# Patient Record
Sex: MALE | Race: WHITE | NOT HISPANIC OR LATINO | Employment: OTHER | URBAN - METROPOLITAN AREA
[De-identification: names, ages, dates, MRNs, and addresses within clinical notes are randomized per-mention and may not be internally consistent; named-entity substitution may affect disease eponyms.]

---

## 2017-01-03 RX ORDER — CHLORDIAZEPOXIDE HYDROCHLORIDE AND CLIDINIUM BROMIDE 5; 2.5 MG/1; MG/1
1 CAPSULE ORAL
Qty: 90 CAPSULE | Refills: 0 | Status: SHIPPED | OUTPATIENT
Start: 2017-01-03 | End: 2017-01-19

## 2017-01-04 ENCOUNTER — OFFICE VISIT (OUTPATIENT)
Dept: GASTROENTEROLOGY | Facility: CLINIC | Age: 47
End: 2017-01-04

## 2017-01-04 VITALS — HEIGHT: 73 IN | WEIGHT: 252.6 LBS | BODY MASS INDEX: 33.48 KG/M2

## 2017-01-04 DIAGNOSIS — R10.33 PERIUMBILICAL ABDOMINAL PAIN: Primary | ICD-10-CM

## 2017-01-04 DIAGNOSIS — K42.9 UMBILICAL HERNIA WITHOUT OBSTRUCTION AND WITHOUT GANGRENE: ICD-10-CM

## 2017-01-04 PROCEDURE — 99213 OFFICE O/P EST LOW 20 MIN: CPT | Performed by: INTERNAL MEDICINE

## 2017-01-04 RX ORDER — HYDROCORTISONE ACETATE 25 MG/1
25 SUPPOSITORY RECTAL 2 TIMES DAILY PRN
Qty: 30 SUPPOSITORY | Refills: 0 | Status: SHIPPED | OUTPATIENT
Start: 2017-01-04 | End: 2017-01-19

## 2017-01-04 NOTE — PROGRESS NOTES
PATIENT INFORMATION  Robles Rodrigues       - 1970    CHIEF COMPLAINT  Chief Complaint   Patient presents with   • Abdominal Pain     FOLLOW UP ON EGD AND C/S   • Rectal Pain       HISTORY OF PRESENT ILLNESS  Abdominal Pain     Rectal Pain   Associated symptoms include abdominal pain.   he is doing better with upper abdominal pain with librax tid. He is still having intermittent umbilical pain several times daily.  Weight has been stable. He has a bm daily but does note that stools are hard and there is some rectal pain with this. Biopsies were reviewed and polyps were TA and gastritis without h.pylori. He is on ppi therapy but does not take miralax daily. Hemorrhoids noted and i think rectal pain with constipation is likely hemorrhioids.        REVIEW OF SYSTEMS  Review of Systems   Gastrointestinal: Positive for abdominal pain and rectal pain.   All other systems reviewed and are negative.        ACTIVE PROBLEMS  Patient Active Problem List    Diagnosis   • Fast heart beat [R00.0]   • Tachycardia [R00.0]   • Edema [R60.9]         PAST MEDICAL HISTORY  Past Medical History   Diagnosis Date   • GERD (gastroesophageal reflux disease)    • Hyperlipidemia    • Hypertension    • Obesity    • Tachycardia          SURGICAL HISTORY  Past Surgical History   Procedure Laterality Date   • Cholecystectomy     • Urethral dilation     • Endoscopy N/A 2016     Procedure: ESOPHAGOGASTRODUODENOSCOPY w/ biopsies;  Surgeon: Laura Galloway MD;  Location: [facility] OR;  Service:    • Colonoscopy N/A 2016     Procedure: COLONOSCOPY w/ polypectomy;  Surgeon: Laura Galloway MD;  Location: [facility] OR;  Service:          FAMILY HISTORY  Family History   Problem Relation Age of Onset   • Depression Father    • Coronary artery disease Father    • Hypertension Father    • Heart disease Father    • Hypertension Sister    • Hypertension Brother          SOCIAL HISTORY  Social History     Occupational History   • Not on  "file.     Social History Main Topics   • Smoking status: Never Smoker   • Smokeless tobacco: Never Used   • Alcohol use No   • Drug use: No   • Sexual activity: Defer         CURRENT MEDICATIONS    Current Outpatient Prescriptions:   •  aspirin 81 MG EC tablet, Take 81 mg by mouth Daily., Disp: , Rfl:   •  busPIRone (BUSPAR) 10 MG tablet, Take 10 mg by mouth 2 (two) times a day., Disp: , Rfl:   •  citalopram (CeleXA) 20 MG tablet, Take 20 mg by mouth Daily., Disp: , Rfl:   •  clidinium-chlordiazePOXIDE (LIBRAX) 5-2.5 MG per capsule, Take 1 capsule by mouth 3 (Three) Times a Day With Meals., Disp: 90 capsule, Rfl: 0  •  hydrocortisone (ANUSOL-HC) 25 MG suppository, Insert 1 suppository into the rectum 2 (Two) Times a Day As Needed for hemorrhoids (rectal discomfort)., Disp: 30 suppository, Rfl: 0  •  metoprolol succinate XL (TOPROL-XL) 25 MG 24 hr tablet, Take 25 mg by mouth 2 (two) times a day., Disp: , Rfl:   •  omeprazole (PriLOSEC) 20 MG capsule, Take 20 mg by mouth daily., Disp: , Rfl:   •  rosuvastatin (CRESTOR) 10 MG tablet, Take  by mouth daily., Disp: , Rfl:   •  silodosin (RAPAFLO) 8 MG capsule capsule, Take  by mouth daily., Disp: , Rfl:     ALLERGIES  Review of patient's allergies indicates no known allergies.    VITALS  Vitals:    01/04/17 1456   Weight: 252 lb 9.6 oz (115 kg)   Height: 73\" (185.4 cm)       LAST RESULTS   Admission on 12/14/2016, Discharged on 12/14/2016   Component Date Value Ref Range Status   • Case Report 12/14/2016    Final                    Value:Surgical Pathology Report                         Case: [id number]                                  Authorizing Provider:  Laura Galloway MD          Collected:           12/14/2016 01:01 PM          Ordering Location:     [facility]   Received:            12/14/2016 05:07 PM                                 OR                                                                           Pathologist:           Aditi So " MD Anna                                                     Specimens:   1) - Small Intestine, Duodenum, small bowel biopsy                                                  2) - Stomach, gastric biopsy                                                                        3) - Large Intestine, Cecum, cecal polyp                                                            4) - Large Intestine, Left / Descending Colon, descending polyp                           • Final Diagnosis 12/14/2016    Final                    Value:This result contains rich text formatting which cannot be displayed here.     No results found.    PHYSICAL EXAM  Physical Exam   Constitutional: He is oriented to person, place, and time. He appears well-developed and well-nourished. No distress.   HENT:   Head: Normocephalic and atraumatic.   Eyes: EOM are normal. Pupils are equal, round, and reactive to light.   Neck: Neck supple. No tracheal deviation present.   Cardiovascular: Normal rate, regular rhythm, normal heart sounds and intact distal pulses.  Exam reveals no gallop and no friction rub.    No murmur heard.  Pulmonary/Chest: Effort normal and breath sounds normal. No respiratory distress. He has no wheezes. He has no rales. He exhibits no tenderness.   Abdominal: Soft. Bowel sounds are normal. He exhibits no distension. There is tenderness. There is no rebound and no guarding.   Umbilical hernia is soft but is tender  To palpation   Musculoskeletal: He exhibits no edema.   Lymphadenopathy:     He has no cervical adenopathy.   Neurological: He is alert and oriented to person, place, and time.   Skin: Skin is warm. He is not diaphoretic. No erythema.   Psychiatric: He has a normal mood and affect. His behavior is normal. Judgment and thought content normal.   Nursing note and vitals reviewed.      ASSESSMENT  Diagnoses and all orders for this visit:    Periumbilical abdominal pain  -     FL small bowel follow through; Future    Umbilical  hernia without obstruction and without gangrene    Other orders  -     hydrocortisone (ANUSOL-HC) 25 MG suppository; Insert 1 suppository into the rectum 2 (Two) Times a Day As Needed for hemorrhoids (rectal discomfort).          PLAN  No Follow-up on file.

## 2017-01-04 NOTE — LETTER
January 4, 2017     Aron White MD  [address] [address]    Patient: Robles Rodrigues   YOB: 1970   Date of Visit: 1/4/2017       Dear Dr. Christopher MD:    Robles Ravi was in my office today. Below are the relevant portions of my assessment and plan of care.           If you have questions, please do not hesitate to call me. I look forward to following Robles along with you.         Sincerely,        Laura Galloway MD        CC: No Recipients

## 2017-01-04 NOTE — MR AVS SNAPSHOT
Robles JANEE Rodrigues   1/4/2017 3:00 PM   Office Visit    Dept Phone:  [phone]   Encounter #:  [id number]    Provider:  Laura Galloway MD   Department:  [facility] GASTROENTEROLOGY                Your Full Care Plan              Today's Medication Changes          These changes are accurate as of: 1/4/17  3:18 PM.  If you have any questions, ask your nurse or doctor.               New Medication(s)Ordered:     hydrocortisone 25 MG suppository   Commonly known as:  ANUSOL-HC   Insert 1 suppository into the rectum 2 (Two) Times a Day As Needed for hemorrhoids (rectal discomfort).   Started by:  Laura Galloway MD            Where to Get Your Medications      These medications were sent to [facility] - [place], [place] - [address] - [phone]  - [phone] FX  [address], [place] [address]     Phone:  [phone]     hydrocortisone 25 MG suppository                  Your Updated Medication List          This list is accurate as of: 1/4/17  3:18 PM.  Always use your most recent med list.                aspirin 81 MG EC tablet       busPIRone 10 MG tablet   Commonly known as:  BUSPAR       citalopram 20 MG tablet   Commonly known as:  CeleXA       clidinium-chlordiazePOXIDE 5-2.5 MG per capsule   Commonly known as:  LIBRAX   Take 1 capsule by mouth 3 (Three) Times a Day With Meals.       CRESTOR 10 MG tablet   Generic drug:  rosuvastatin       hydrocortisone 25 MG suppository   Commonly known as:  ANUSOL-HC   Insert 1 suppository into the rectum 2 (Two) Times a Day As Needed for hemorrhoids (rectal discomfort).       metoprolol succinate XL 25 MG 24 hr tablet   Commonly known as:  TOPROL-XL       omeprazole 20 MG capsule   Commonly known as:  priLOSEC       RAPAFLO 8 MG capsule capsule   Generic drug:  silodosin               You Were Diagnosed With        Codes Comments    Periumbilical abdominal pain    -  Primary ICD-10-CM:  "R10.33  ICD-9-CM: 789.05     Umbilical hernia without obstruction and without gangrene     ICD-10-CM: K42.9  ICD-9-CM: 553.1       Instructions     None    Patient Instructions History      Upcoming Appointments     Visit Type Date Time Department    OFFICE VISIT 1/4/2017  3:00 PM [facility]      MyChart Signup     Our records indicate that you have declined [facility] MyChart signup. If you would like to sign up for [employer], please email [email or web address] or call [phone] to obtain an activation code.             Other Info from Your Visit           Allergies     No Known Allergies      Reason for Visit     Abdominal Pain FOLLOW UP ON EGD AND C/S    Rectal Pain           Vital Signs     Height Weight Body Mass Index Smoking Status          73\" (185.4 cm) 252 lb 9.6 oz (115 kg) 33.33 kg/m2 Never Smoker        Problems and Diagnoses Noted     Abdominal pain, around belly button    -  Primary    Umbilical hernia without obstruction and without gangrene            "

## 2017-01-11 ENCOUNTER — HOSPITAL ENCOUNTER (OUTPATIENT)
Dept: GENERAL RADIOLOGY | Facility: HOSPITAL | Age: 47
Discharge: HOME | End: 2017-01-11
Attending: INTERNAL MEDICINE | Admitting: INTERNAL MEDICINE

## 2017-01-11 DIAGNOSIS — R10.33 PERIUMBILICAL ABDOMINAL PAIN: ICD-10-CM

## 2017-01-11 PROCEDURE — 74250 X-RAY XM SM INT 1CNTRST STD: CPT

## 2017-01-18 ENCOUNTER — OFFICE VISIT (OUTPATIENT)
Dept: SURGERY | Facility: CLINIC | Age: 47
End: 2017-01-18

## 2017-01-18 VITALS
WEIGHT: 252 LBS | HEIGHT: 73 IN | SYSTOLIC BLOOD PRESSURE: 122 MMHG | BODY MASS INDEX: 33.4 KG/M2 | DIASTOLIC BLOOD PRESSURE: 82 MMHG

## 2017-01-18 DIAGNOSIS — K42.9 UMBILICAL HERNIA WITHOUT OBSTRUCTION AND WITHOUT GANGRENE: Primary | ICD-10-CM

## 2017-01-18 PROCEDURE — 99212 OFFICE O/P EST SF 10 MIN: CPT | Performed by: SURGERY

## 2017-01-18 NOTE — H&P
PATIENT INFORMATION  Robles Rodrigues  FU [facility], PT STATES AREA IS MORE PAINFUL     - 1970    CHIEF COMPLAINT  Chief Complaint   Patient presents with   • Follow-up       HISTORY OF PRESENT ILLNESS  HPI is upper abdominal pain has resolved on the Librax.  He has had recent endoscopy by Dr. Galloway.  He complains of daily periumbilical pain.  He denies any nausea or vomiting.  He did have a CT scan performed that showed a small umbilical hernia.        REVIEW OF SYSTEMS  Review of Systems   Gastrointestinal: Positive for abdominal pain.         ACTIVE PROBLEMS  Patient Active Problem List    Diagnosis   • Fast heart beat [R00.0]   • Tachycardia [R00.0]   • Edema [R60.9]   • Benign essential hypertension [I10]   • Chronic coronary artery disease [I25.10]     Overview Note:     Overview:   #1 normal nuclear stress test and normal 2-D Doppler echo 2014  #2 CT scan of the chest demonstrating coronary calcium 2014     • Hyperlipidemia [E78.5]         PAST MEDICAL HISTORY  Past Medical History   Diagnosis Date   • GERD (gastroesophageal reflux disease)    • Hyperlipidemia    • Hypertension    • Obesity    • Tachycardia          SURGICAL HISTORY  Past Surgical History   Procedure Laterality Date   • Cholecystectomy     • Urethral dilation     • Endoscopy N/A 2016     Procedure: ESOPHAGOGASTRODUODENOSCOPY w/ biopsies;  Surgeon: Laura Galloway MD;  Location: [facility] OR;  Service:    • Colonoscopy N/A 2016     Procedure: COLONOSCOPY w/ polypectomy;  Surgeon: Laura Galloway MD;  Location: [facility] OR;  Service:          FAMILY HISTORY  Family History   Problem Relation Age of Onset   • Depression Father    • Coronary artery disease Father    • Hypertension Father    • Heart disease Father    • Hypertension Sister    • Hypertension Brother          SOCIAL HISTORY  Social History     Occupational History   • Not on file.     Social History Main Topics   • Smoking status: Never Smoker   •  "Smokeless tobacco: Never Used   • Alcohol use No   • Drug use: No   • Sexual activity: Defer         CURRENT MEDICATIONS    Current Outpatient Prescriptions:   •  aspirin 81 MG EC tablet, Take 81 mg by mouth Daily., Disp: , Rfl:   •  busPIRone (BUSPAR) 10 MG tablet, Take 10 mg by mouth 2 (two) times a day., Disp: , Rfl:   •  citalopram (CeleXA) 20 MG tablet, Take 20 mg by mouth Daily., Disp: , Rfl:   •  clidinium-chlordiazePOXIDE (LIBRAX) 5-2.5 MG per capsule, Take 1 capsule by mouth 3 (Three) Times a Day With Meals., Disp: 90 capsule, Rfl: 0  •  hydrocortisone (ANUSOL-HC) 25 MG suppository, Insert 1 suppository into the rectum 2 (Two) Times a Day As Needed for hemorrhoids (rectal discomfort)., Disp: 30 suppository, Rfl: 0  •  metoprolol succinate XL (TOPROL-XL) 25 MG 24 hr tablet, Take 25 mg by mouth 2 (two) times a day., Disp: , Rfl:   •  omeprazole (PriLOSEC) 20 MG capsule, Take 20 mg by mouth daily., Disp: , Rfl:   •  rosuvastatin (CRESTOR) 10 MG tablet, Take  by mouth daily., Disp: , Rfl:   •  silodosin (RAPAFLO) 8 MG capsule capsule, Take  by mouth daily., Disp: , Rfl:     ALLERGIES  Review of patient's allergies indicates no known allergies.    VITALS  Vitals:    01/18/17 1530   BP: 122/82   Weight: 252 lb (114 kg)   Height: 73\" (185.4 cm)       LAST RESULTS   Admission on 12/14/2016, Discharged on 12/14/2016   Component Date Value Ref Range Status   • Case Report 12/14/2016    Final                    Value:Surgical Pathology Report                         Case: [id number]                                  Authorizing Provider:  Laura Galloway MD          Collected:           12/14/2016 01:01 PM          Ordering Location:     [facility]   Received:            12/14/2016 05:07 PM                                 OR                                                                           Pathologist:           Aditi Castillo MD                                                     Specimens: "   1) - Small Intestine, Duodenum, small bowel biopsy                                                  2) - Stomach, gastric biopsy                                                                        3) - Large Intestine, Cecum, cecal polyp                                                            4) - Large Intestine, Left / Descending Colon, descending polyp                           • Final Diagnosis 12/14/2016    Final                    Value:This result contains rich text formatting which cannot be displayed here.     Fl Small Bowel Follow Through    Result Date: 1/11/2017  Narrative: Small bowel follow-through  INDICATION: Periumbilical abdominal pain for one week.  FINDINGS:  image demonstrates cholecystectomy clips. A small bowel follow-through was performed. Fluoroscopy time is 43 seconds. 16 total images were obtained, including the  views. Transit time through the small bowel is prompt with contrast reaching the colon within 40 minutes. The small bowel is uniformly normal in caliber. There is no evidence of bowel wall thickening. No mass effect is seen within the abdomen. Manual spot compression of multiple bowel loops is also normal. The terminal ileum is normal.      Impression: Normal small bowel follow-through.  This report was finalized on 1/11/2017 10:44 AM by Dr. Chandra Tadeo MD.        PHYSICAL EXAM  Physical Exam solar white male in no active distress.  He is oriented ×3.  His gait is normal.  His trachea is in the midline.  His heart shows regular rate and rhythm.  His lungs are clear and equal.  He has a small reducible umbilical hernia that is reducible.  I reviewed his CT scan myself that showed a small umbilical hernia.    ASSESSMENT  Umbilical hernia      PLAN  The risks benefits and options were discussed with the patient and his mother in detail.  We will proceed with an open repair at [facility] on January 20 on an outpatient  basis.

## 2017-01-18 NOTE — PROGRESS NOTES
PATIENT INFORMATION  Robles Rodrigues  FU [facility] HERN[facility], PT STATES AREA IS MORE PAINFUL     - 1970    CHIEF COMPLAINT  Chief Complaint   Patient presents with   • Follow-up       HISTORY OF PRESENT ILLNESS  HPI is upper abdominal pain has resolved on the Librax.  He has had recent endoscopy by Dr. Galloway.  He complains of daily periumbilical pain.  He denies any nausea or vomiting.  He did have a CT scan performed that showed a small umbilical hernia.        REVIEW OF SYSTEMS  Review of Systems   Gastrointestinal: Positive for abdominal pain.         ACTIVE PROBLEMS  Patient Active Problem List    Diagnosis   • Fast heart beat [R00.0]   • Tachycardia [R00.0]   • Edema [R60.9]   • Benign essential hypertension [I10]   • Chronic coronary artery disease [I25.10]     Overview Note:     Overview:   #1 normal nuclear stress test and normal 2-D Doppler echo 2014  #2 CT scan of the chest demonstrating coronary calcium 2014     • Hyperlipidemia [E78.5]         PAST MEDICAL HISTORY  Past Medical History   Diagnosis Date   • GERD (gastroesophageal reflux disease)    • Hyperlipidemia    • Hypertension    • Obesity    • Tachycardia          SURGICAL HISTORY  Past Surgical History   Procedure Laterality Date   • Cholecystectomy     • Urethral dilation     • Endoscopy N/A 2016     Procedure: ESOPHAGOGASTRODUODENOSCOPY w/ biopsies;  Surgeon: Laura Galloway MD;  Location: [facility] OR;  Service:    • Colonoscopy N/A 2016     Procedure: COLONOSCOPY w/ polypectomy;  Surgeon: Laura Galloway MD;  Location: [facility] OR;  Service:          FAMILY HISTORY  Family History   Problem Relation Age of Onset   • Depression Father    • Coronary artery disease Father    • Hypertension Father    • Heart disease Father    • Hypertension Sister    • Hypertension Brother          SOCIAL HISTORY  Social History     Occupational History   • Not on file.     Social History Main Topics   • Smoking status: Never Smoker   •  "Smokeless tobacco: Never Used   • Alcohol use No   • Drug use: No   • Sexual activity: Defer         CURRENT MEDICATIONS    Current Outpatient Prescriptions:   •  aspirin 81 MG EC tablet, Take 81 mg by mouth Daily., Disp: , Rfl:   •  busPIRone (BUSPAR) 10 MG tablet, Take 10 mg by mouth 2 (two) times a day., Disp: , Rfl:   •  citalopram (CeleXA) 20 MG tablet, Take 20 mg by mouth Daily., Disp: , Rfl:   •  clidinium-chlordiazePOXIDE (LIBRAX) 5-2.5 MG per capsule, Take 1 capsule by mouth 3 (Three) Times a Day With Meals., Disp: 90 capsule, Rfl: 0  •  hydrocortisone (ANUSOL-HC) 25 MG suppository, Insert 1 suppository into the rectum 2 (Two) Times a Day As Needed for hemorrhoids (rectal discomfort)., Disp: 30 suppository, Rfl: 0  •  metoprolol succinate XL (TOPROL-XL) 25 MG 24 hr tablet, Take 25 mg by mouth 2 (two) times a day., Disp: , Rfl:   •  omeprazole (PriLOSEC) 20 MG capsule, Take 20 mg by mouth daily., Disp: , Rfl:   •  rosuvastatin (CRESTOR) 10 MG tablet, Take  by mouth daily., Disp: , Rfl:   •  silodosin (RAPAFLO) 8 MG capsule capsule, Take  by mouth daily., Disp: , Rfl:     ALLERGIES  Review of patient's allergies indicates no known allergies.    VITALS  Vitals:    01/18/17 1530   BP: 122/82   Weight: 252 lb (114 kg)   Height: 73\" (185.4 cm)       LAST RESULTS   Admission on 12/14/2016, Discharged on 12/14/2016   Component Date Value Ref Range Status   • Case Report 12/14/2016    Final                    Value:Surgical Pathology Report                         Case: [id number]                                  Authorizing Provider:  Laura Galloway MD          Collected:           12/14/2016 01:01 PM          Ordering Location:     [facility]   Received:            12/14/2016 05:07 PM                                 OR                                                                           Pathologist:           Aditi Castillo MD                                                     Specimens: "   1) - Small Intestine, Duodenum, small bowel biopsy                                                  2) - Stomach, gastric biopsy                                                                        3) - Large Intestine, Cecum, cecal polyp                                                            4) - Large Intestine, Left / Descending Colon, descending polyp                           • Final Diagnosis 12/14/2016    Final                    Value:This result contains rich text formatting which cannot be displayed here.     Fl Small Bowel Follow Through    Result Date: 1/11/2017  Narrative: Small bowel follow-through  INDICATION: Periumbilical abdominal pain for one week.  FINDINGS:  image demonstrates cholecystectomy clips. A small bowel follow-through was performed. Fluoroscopy time is 43 seconds. 16 total images were obtained, including the  views. Transit time through the small bowel is prompt with contrast reaching the colon within 40 minutes. The small bowel is uniformly normal in caliber. There is no evidence of bowel wall thickening. No mass effect is seen within the abdomen. Manual spot compression of multiple bowel loops is also normal. The terminal ileum is normal.      Impression: Normal small bowel follow-through.  This report was finalized on 1/11/2017 10:44 AM by Dr. Chandra Tadeo MD.        PHYSICAL EXAM  Physical Exam solar white male in no active distress.  He is oriented ×3.  His gait is normal.  His trachea is in the midline.  His heart shows regular rate and rhythm.  His lungs are clear and equal.  He has a small reducible umbilical hernia that is reducible.  I reviewed his CT scan myself that showed a small umbilical hernia.    ASSESSMENT  Umbilical hernia      PLAN  The risks benefits and options were discussed with the patient and his mother in detail.  We will proceed with an open repair at [facility] on January 20 on an outpatient  basis.

## 2017-01-18 NOTE — LETTER
2017     Aron White MD  [address] [facility]  [place] [address]    Patient: Robles Rodrigues   YOB: 1970   Date of Visit: 2017       Dear Dr. Christopher MD:    Thank you for referring Robles Rodrigues to me for evaluation. Below are the relevant portions of my assessment and plan of care.    If you have questions, please do not hesitate to call me. I look forward to following Robles along with you.         Sincerely,        Ramo Medina MD        CC: No Recipients  Ramo Medina MD  2017  3:50 PM  Sign at close encounter      PATIENT INFORMATION  Robles Rodrigues  FU [facility]ERN[facility], PT STATES AREA IS MORE PAINFUL     - 1970    CHIEF COMPLAINT  Chief Complaint   Patient presents with   • Follow-up       HISTORY OF PRESENT ILLNESS  HPI is upper abdominal pain has resolved on the Librax.  He has had recent endoscopy by Dr. Galloway.  He complains of daily periumbilical pain.  He denies any nausea or vomiting.  He did have a CT scan performed that showed a small umbilical hernia.        REVIEW OF SYSTEMS  Review of Systems   Gastrointestinal: Positive for abdominal pain.         ACTIVE PROBLEMS  Patient Active Problem List    Diagnosis   • Fast heart beat [R00.0]   • Tachycardia [R00.0]   • Edema [R60.9]   • Benign essential hypertension [I10]   • Chronic coronary artery disease [I25.10]     Overview Note:     Overview:   #1 normal nuclear stress test and normal 2-D Doppler echo 2014  #2 CT scan of the chest demonstrating coronary calcium 2014     • Hyperlipidemia [E78.5]         PAST MEDICAL HISTORY  Past Medical History   Diagnosis Date   • GERD (gastroesophageal reflux disease)    • Hyperlipidemia    • Hypertension    • Obesity    • Tachycardia          SURGICAL HISTORY  Past Surgical History   Procedure Laterality Date   • Cholecystectomy     • Urethral dilation     • Endoscopy N/A 2016     Procedure: ESOPHAGOGASTRODUODENOSCOPY w/ biopsies;  Surgeon:  "Laura Galloway MD;  Location: [facility] OR;  Service:    • Colonoscopy N/A 12/14/2016     Procedure: COLONOSCOPY w/ polypectomy;  Surgeon: Laura Galloway MD;  Location: [facility] OR;  Service:          FAMILY HISTORY  Family History   Problem Relation Age of Onset   • Depression Father    • Coronary artery disease Father    • Hypertension Father    • Heart disease Father    • Hypertension Sister    • Hypertension Brother          SOCIAL HISTORY  Social History     Occupational History   • Not on file.     Social History Main Topics   • Smoking status: Never Smoker   • Smokeless tobacco: Never Used   • Alcohol use No   • Drug use: No   • Sexual activity: Defer         CURRENT MEDICATIONS    Current Outpatient Prescriptions:   •  aspirin 81 MG EC tablet, Take 81 mg by mouth Daily., Disp: , Rfl:   •  busPIRone (BUSPAR) 10 MG tablet, Take 10 mg by mouth 2 (two) times a day., Disp: , Rfl:   •  citalopram (CeleXA) 20 MG tablet, Take 20 mg by mouth Daily., Disp: , Rfl:   •  clidinium-chlordiazePOXIDE (LIBRAX) 5-2.5 MG per capsule, Take 1 capsule by mouth 3 (Three) Times a Day With Meals., Disp: 90 capsule, Rfl: 0  •  hydrocortisone (ANUSOL-HC) 25 MG suppository, Insert 1 suppository into the rectum 2 (Two) Times a Day As Needed for hemorrhoids (rectal discomfort)., Disp: 30 suppository, Rfl: 0  •  metoprolol succinate XL (TOPROL-XL) 25 MG 24 hr tablet, Take 25 mg by mouth 2 (two) times a day., Disp: , Rfl:   •  omeprazole (PriLOSEC) 20 MG capsule, Take 20 mg by mouth daily., Disp: , Rfl:   •  rosuvastatin (CRESTOR) 10 MG tablet, Take  by mouth daily., Disp: , Rfl:   •  silodosin (RAPAFLO) 8 MG capsule capsule, Take  by mouth daily., Disp: , Rfl:     ALLERGIES  Review of patient's allergies indicates no known allergies.    VITALS  Vitals:    01/18/17 1530   BP: 122/82   Weight: 252 lb (114 kg)   Height: 73\" (185.4 cm)       LAST RESULTS   Admission on 12/14/2016, Discharged on 12/14/2016   Component Date Value Ref Range Status "   • Case Report 12/14/2016    Final                    Value:Surgical Pathology Report                         Case: [id number]                                  Authorizing Provider:  Laura Galloway MD          Collected:           12/14/2016 01:01 PM          Ordering Location:     [facility]   Received:            12/14/2016 05:07 PM                                 OR                                                                           Pathologist:           Aditi Castillo MD                                                     Specimens:   1) - Small Intestine, Duodenum, small bowel biopsy                                                  2) - Stomach, gastric biopsy                                                                        3) - Large Intestine, Cecum, cecal polyp                                                            4) - Large Intestine, Left / Descending Colon, descending polyp                           • Final Diagnosis 12/14/2016    Final                    Value:This result contains rich text formatting which cannot be displayed here.     Fl Small Bowel Follow Through    Result Date: 1/11/2017  Narrative: Small bowel follow-through  INDICATION: Periumbilical abdominal pain for one week.  FINDINGS:  image demonstrates cholecystectomy clips. A small bowel follow-through was performed. Fluoroscopy time is 43 seconds. 16 total images were obtained, including the  views. Transit time through the small bowel is prompt with contrast reaching the colon within 40 minutes. The small bowel is uniformly normal in caliber. There is no evidence of bowel wall thickening. No mass effect is seen within the abdomen. Manual spot compression of multiple bowel loops is also normal. The terminal ileum is normal.      Impression: Normal small bowel follow-through.  This report was finalized on 1/11/2017 10:44 AM by Dr. Chandra Tadeo MD.        PHYSICAL EXAM  Physical Exam solar  white male in no active distress.  He is oriented ×3.  His gait is normal.  His trachea is in the midline.  His heart shows regular rate and rhythm.  His lungs are clear and equal.  He has a small reducible umbilical hernia that is reducible.  I reviewed his CT scan myself that showed a small umbilical hernia.    ASSESSMENT  Umbilical hernia      PLAN  The risks benefits and options were discussed with the patient and his mother in detail.  We will proceed with an open repair at [facility] on January 20 on an outpatient basis.

## 2017-01-18 NOTE — MR AVS SNAPSHOT
"Padminiangel Rodrigues   1/18/2017 3:30 PM   Office Visit    Dept Phone:  [phone]   Encounter #:  [id number]    Provider:  Ramo Medina MD   Department:  [facility] GENERAL SURGERY                Your Full Care Plan              Your Updated Medication List          This list is accurate as of: 1/18/17  3:51 PM.  Always use your most recent med list.                aspirin 81 MG EC tablet       busPIRone 10 MG tablet   Commonly known as:  BUSPAR       citalopram 20 MG tablet   Commonly known as:  CeleXA       clidinium-chlordiazePOXIDE 5-2.5 MG per capsule   Commonly known as:  LIBRAX   Take 1 capsule by mouth 3 (Three) Times a Day With Meals.       CRESTOR 10 MG tablet   Generic drug:  rosuvastatin       hydrocortisone 25 MG suppository   Commonly known as:  ANUSOL-HC   Insert 1 suppository into the rectum 2 (Two) Times a Day As Needed for hemorrhoids (rectal discomfort).       metoprolol succinate XL 25 MG 24 hr tablet   Commonly known as:  TOPROL-XL       omeprazole 20 MG capsule   Commonly known as:  priLOSEC       RAPAFLO 8 MG capsule capsule   Generic drug:  silodosin               You Were Diagnosed With        Codes Comments    Umbilical hernia without obstruction and without gangrene    -  Primary ICD-10-CM: K42.9  ICD-9-CM: 553.1       Instructions     None    Patient Instructions History      Upcoming Appointments     Visit Type Date Time Department    OFFICE VISIT 1/18/2017  3:30 PM [facility]      MyChart Signup     Our records indicate that you have declined [facility] [employer] signup. If you would like to sign up for [employer], please email [email or web address] or call [phone] to obtain an activation code.             Other Info from Your Visit           Allergies     No Known Allergies      Reason for Visit     Follow-up           Vital Signs     Blood Pressure Height Weight Body Mass Index Smoking Status       122/82 73\" (185.4 " cm) 252 lb (114 kg) 33.25 kg/m2 Never Smoker       Problems and Diagnoses Noted     Benign essential hypertension    Heart disease due to blocked artery    High cholesterol or triglycerides    Umbilical hernia without obstruction and without gangrene    -  Primary

## 2017-01-19 ENCOUNTER — APPOINTMENT (OUTPATIENT)
Dept: PREADMISSION TESTING | Facility: HOSPITAL | Age: 47
End: 2017-01-19
Attending: SURGERY

## 2017-01-19 ENCOUNTER — ANESTHESIA EVENT (OUTPATIENT)
Dept: PERIOP | Facility: HOSPITAL | Age: 47
End: 2017-01-19

## 2017-01-19 VITALS
HEIGHT: 73 IN | BODY MASS INDEX: 33.24 KG/M2 | DIASTOLIC BLOOD PRESSURE: 68 MMHG | SYSTOLIC BLOOD PRESSURE: 114 MMHG | OXYGEN SATURATION: 96 % | WEIGHT: 250.8 LBS | RESPIRATION RATE: 16 BRPM | HEART RATE: 85 BPM

## 2017-01-19 LAB
ANION GAP SERPL CALCULATED.3IONS-SCNC: 11.3 MMOL/L
BUN BLD-MCNC: 10 MG/DL (ref 6–20)
BUN/CREAT SERPL: 11.8 (ref 7–25)
CALCIUM SPEC-SCNC: 9.2 MG/DL (ref 8.6–10.5)
CHLORIDE SERPL-SCNC: 101 MMOL/L (ref 98–107)
CO2 SERPL-SCNC: 27.7 MMOL/L (ref 22–29)
CREAT BLD-MCNC: 0.85 MG/DL (ref 0.76–1.27)
DEPRECATED RDW RBC AUTO: 41.1 FL (ref 37–54)
ERYTHROCYTE [DISTWIDTH] IN BLOOD BY AUTOMATED COUNT: 13.2 % (ref 11.5–14.5)
GFR SERPL CREATININE-BSD FRML MDRD: 96 ML/MIN/1.73
GLUCOSE BLD-MCNC: 114 MG/DL (ref 65–99)
HCT VFR BLD AUTO: 44.8 % (ref 42–52)
HGB BLD-MCNC: 14.7 G/DL (ref 14–18)
MCH RBC QN AUTO: 28.3 PG (ref 27–31)
MCHC RBC AUTO-ENTMCNC: 32.8 G/DL (ref 31–37)
MCV RBC AUTO: 86.3 FL (ref 80–94)
PLATELET # BLD AUTO: 198 10*3/MM3 (ref 140–500)
PMV BLD AUTO: 10.8 FL (ref 7.4–10.4)
POTASSIUM BLD-SCNC: 4.6 MMOL/L (ref 3.5–5.2)
RBC # BLD AUTO: 5.19 10*6/MM3 (ref 4.7–6.1)
SODIUM BLD-SCNC: 140 MMOL/L (ref 136–145)
WBC NRBC COR # BLD: 5.18 10*3/MM3 (ref 4.8–10.8)

## 2017-01-19 PROCEDURE — 93010 ELECTROCARDIOGRAM REPORT: CPT | Performed by: INTERNAL MEDICINE

## 2017-01-19 PROCEDURE — 36415 COLL VENOUS BLD VENIPUNCTURE: CPT

## 2017-01-19 PROCEDURE — 93005 ELECTROCARDIOGRAM TRACING: CPT

## 2017-01-19 PROCEDURE — 80048 BASIC METABOLIC PNL TOTAL CA: CPT | Performed by: SURGERY

## 2017-01-19 PROCEDURE — 85027 COMPLETE CBC AUTOMATED: CPT | Performed by: SURGERY

## 2017-01-19 RX ORDER — DICYCLOMINE HYDROCHLORIDE 10 MG/1
10 CAPSULE ORAL
COMMUNITY
End: 2019-02-06

## 2017-01-19 NOTE — PAT
Pt and mom here for PAT visit.  Pre-op tests completed, chg soap given, and instructions reviewed.

## 2017-01-19 NOTE — DISCHARGE INSTRUCTIONS
PRE-ADMISSION TESTING INSTRUCTIONS FOR ADULTS    Take your Metoprolol and Omeprazole with a small sip of water.  General Instructions:    • Do not eat or drink after midnight: includes water, mints, or gum. You may brush your teeth.  • Do not smoke, chew tobacco, or drink alcohol.  • Bring medications in original bottles, any inhalers and if applicable your C-PAP/ BI-PAP machine.  • Wear clean comfortable clothes and socks.  • Do not wear contact lenses, lotion, deodorant, or make-up.  Bring a case for your glasses if applicable.   • Bring crutches or walker if applicable.  • Leave all other valuables and jewelry at home.      Preventing a Surgical Site Infection:    • Shower the night before and on the morning of surgery using the chlorhexidine soap you were given.  Use a clean washcloth with the soap. Do not use the CHG soap on your hair, face, or private areas. Wash your body gently for five (5) minutes. Do not scrub your skin too hard.  Dry with a clean towel and dress in clean clothing.    • Do not shave the surgical area for a week prior to surgery  because the razor can irritate skin and make it easier to develop an infection.    • Make sure you, your family, and all healthcare providers clean their hands with soap and water or an alcohol based hand  before caring for you or your wound.    • If at all possible, quit smoking as many days before surgery as you can.    Day of surgery:    Your surgeon’s office will advise you of your arrival time for the day of surgery.    Upon arrival, a Pre-op nurse and Anesthesia provider will review your health history, obtain vital signs, and answer questions you may have.  The only belongings needed at this time will be your home medications and if applicable your C-PAP/BI-PAP machine.  If you are staying overnight your family can leave the rest of your belongings in the car and bring them to your room later.  A Pre-op nurse will start an IV and you may receive  medication in preparation for surgery, including something to help you relax.  Your family will be able to see you in the Pre-op area.  While you are in surgery your family should notify the waiting room  if they leave the waiting room area and provide a contact phone number.    IF you have any questions, you can call the Pre-Admission Department at [phone] or your surgeon's office.    Please be aware that surgery does come with discomfort.  We want to make every effort to control your discomfort so please discuss any uncontrolled symptoms with your nurse.   Your doctor will most likely have prescribed pain medications.      If you are going home after surgery you will receive individualized written care instructions before being discharged.  A responsible adult must drive you to and from the hospital on the day of your surgery and stay with you for 24 hours.    If you are staying overnight following surgery, you will be transported to your hospital room following the recovery period.  [facility] has all private rooms.    Deductibles and co-payments are collected on the day of service. Please be prepared to pay the required co-pay, deductible or deposit on the day of service as defined by your plan.      Umbilical Herniorrhaphy  Herniorrhaphy is surgery to repair a hernia. A hernia is the protrusion of a part of an organ through an abdominal opening. An umbilical hernia means that your hernia is in the area around your navel. If the hernia is not repaired, the gap could get bigger. Your intestines or other tissues, such as fat, could get trapped in the gap. This can lead to other health problems, such as blocked intestines. If the hernia is fixed before problems set in, you may be allowed to go home the same day as the surgery (outpatient).  LET YOUR HEALTH CARE PROVIDER KNOW ABOUT:  · Allergies to food or medicine.  · Medicines taken, including vitamins, herbs, eye drops,  over-the-counter medicines, and creams.  · Use of steroids (by mouth or creams).  · Previous problems with anesthetics or numbing medicines.  · History of bleeding problems or blood clots.  · Previous surgery.  · Other health problems, including diabetes and kidney problems.  · Possibility of pregnancy, if this applies.  RISKS AND COMPLICATIONS  · Pain.  · Excessive bleeding.  · Hematoma. This is a pocket of blood that collects under the surgery site.  · Infection at the surgery site.  · Numbness at the surgery site.  · Swelling and bruising.  · Blood clots.  · Intestinal damage (rare).  · Scarring.  · Skin damage.  · Development of another hernia. This may require another surgery.  BEFORE THE PROCEDURE  · Ask your health care provider about changing or stopping your regular medicines. You may need to stop taking aspirin, nonsteroidal anti-inflammatory drugs (NSAIDs), vitamin E, and blood thinners as early as 2 weeks before the procedure.  · Do not eat or drink for 8 hours before the procedure, or as directed by your health care provider.  · You might be asked to shower or wash with an antibacterial soap before the procedure.  · Wear comfortable clothes that will be easy to put on after the procedure.  PROCEDURE  You will be given an intravenous (IV) tube. A needle will be inserted in your arm. Medicine will flow directly into your body through this needle. You might be given medicine to help you relax (sedative). You will be given medicine that numbs the area (local anesthetic) or medicine that makes you sleep (general anesthetic).  If you have open surgery:  · The surgeon will make a cut (incision) in your abdomen.  · The gap in the muscle wall will be repaired. The surgeon may sew the edges together over the gap or use a mesh material to strengthen the area. When mesh is used, the body grows new, strong tissue into and around it. This new tissue closes the gap.  · The surgeon will close the incision.  If you have  laparoscopic surgery:  · The surgeon will make several small incisions in your abdomen.  · A thin, lighted tube (laparoscope) will be inserted into the abdomen through an incision. A camera is attached to the laparoscope that allows the surgeon to see inside the abdomen.  · Tools will be inserted through the other incisions to repair the hernia. Usually, mesh is used to cover the gap.  · The surgeon will close the incisions with stitches.  AFTER THE PROCEDURE  · You will be taken to a recovery area. A nurse will watch and check your progress.  · When you are awake, feeling well, and taking fluids well, you may be allowed to go home. In some cases, you may need to stay overnight in the hospital.  · Arrange for someone to drive you home.     This information is not intended to replace advice given to you by your health care provider. Make sure you discuss any questions you have with your health care provider.     Document Released: 03/16/2010 Document Revised: 01/08/2016 Document Reviewed: 03/20/2013  [employer] Interactive Patient Education ©2016 [employer]

## 2017-01-19 NOTE — MR AVS SNAPSHOT
"                        Robles Rodrigues   1/19/2017 3:00 PM   Appointment    Provider:  MAY RANKIN 2   Department:  [facility] PREADMISSION T   Dept Phone:  [phone]                Your Full Care Plan              Your Updated Medication List          This list is accurate as of: 1/19/17  3:02 PM.  Always use your most recent med list.                aspirin 81 MG EC tablet       busPIRone 10 MG tablet   Commonly known as:  BUSPAR       citalopram 20 MG tablet   Commonly known as:  CeleXA       CRESTOR 10 MG tablet   Generic drug:  rosuvastatin       dicyclomine 10 MG capsule   Commonly known as:  BENTYL       metoprolol succinate XL 25 MG 24 hr tablet   Commonly known as:  TOPROL-XL       omeprazole 20 MG capsule   Commonly known as:  priLOSEC       RAPAFLO 8 MG capsule capsule   Generic drug:  silodosin               MyChart Signup     Our records indicate that you have declined [facility] MyChart signup. If you would like to sign up for [employer], please email [email or web address] or call [phone] to obtain an activation code.             Other Info from Your Visit           Allergies     Influenza Vaccines Other (See Comments)    Swelling at injection site      Vital Signs     Blood Pressure Pulse Respirations Height Weight Oxygen Saturation    114/68 (BP Location: Right arm, Patient Position: Sitting) 85 16 73\" (185.4 cm) 250 lb 12.8 oz (114 kg) 96%    Body Mass Index Smoking Status                33.09 kg/m2 Never Smoker            Discharge Instructions       PRE-ADMISSION TESTING INSTRUCTIONS FOR ADULTS    Take your Metoprolol and Omeprazole with a small sip of water.  General Instructions:    • Do not eat or drink after midnight: includes water, mints, or gum. You may brush your teeth.  • Do not smoke, chew tobacco, or drink alcohol.  • Bring medications in original bottles, any inhalers and if applicable your C-PAP/ BI-PAP machine.  • Wear clean comfortable clothes and " socks.  • Do not wear contact lenses, lotion, deodorant, or make-up.  Bring a case for your glasses if applicable.   • Bring crutches or walker if applicable.  • Leave all other valuables and jewelry at home.      Preventing a Surgical Site Infection:    • Shower the night before and on the morning of surgery using the chlorhexidine soap you were given.  Use a clean washcloth with the soap. Do not use the CHG soap on your hair, face, or private areas. Wash your body gently for five (5) minutes. Do not scrub your skin too hard.  Dry with a clean towel and dress in clean clothing.    • Do not shave the surgical area for a week prior to surgery  because the razor can irritate skin and make it easier to develop an infection.    • Make sure you, your family, and all healthcare providers clean their hands with soap and water or an alcohol based hand  before caring for you or your wound.    • If at all possible, quit smoking as many days before surgery as you can.    Day of surgery:    Your surgeon’s office will advise you of your arrival time for the day of surgery.    Upon arrival, a Pre-op nurse and Anesthesia provider will review your health history, obtain vital signs, and answer questions you may have.  The only belongings needed at this time will be your home medications and if applicable your C-PAP/BI-PAP machine.  If you are staying overnight your family can leave the rest of your belongings in the car and bring them to your room later.  A Pre-op nurse will start an IV and you may receive medication in preparation for surgery, including something to help you relax.  Your family will be able to see you in the Pre-op area.  While you are in surgery your family should notify the waiting room  if they leave the waiting room area and provide a contact phone number.    IF you have any questions, you can call the Pre-Admission Department at [phone] or your surgeon's office.    Please be aware  that surgery does come with discomfort.  We want to make every effort to control your discomfort so please discuss any uncontrolled symptoms with your nurse.   Your doctor will most likely have prescribed pain medications.      If you are going home after surgery you will receive individualized written care instructions before being discharged.  A responsible adult must drive you to and from the hospital on the day of your surgery and stay with you for 24 hours.    If you are staying overnight following surgery, you will be transported to your hospital room following the recovery period.  [facility] has all private rooms.    Deductibles and co-payments are collected on the day of service. Please be prepared to pay the required co-pay, deductible or deposit on the day of service as defined by your plan.      Umbilical Herniorrhaphy  Herniorrhaphy is surgery to repair a hernia. A hernia is the protrusion of a part of an organ through an abdominal opening. An umbilical hernia means that your hernia is in the area around your navel. If the hernia is not repaired, the gap could get bigger. Your intestines or other tissues, such as fat, could get trapped in the gap. This can lead to other health problems, such as blocked intestines. If the hernia is fixed before problems set in, you may be allowed to go home the same day as the surgery (outpatient).  LET YOUR HEALTH CARE PROVIDER KNOW ABOUT:  · Allergies to food or medicine.  · Medicines taken, including vitamins, herbs, eye drops, over-the-counter medicines, and creams.  · Use of steroids (by mouth or creams).  · Previous problems with anesthetics or numbing medicines.  · History of bleeding problems or blood clots.  · Previous surgery.  · Other health problems, including diabetes and kidney problems.  · Possibility of pregnancy, if this applies.  RISKS AND COMPLICATIONS  · Pain.  · Excessive bleeding.  · Hematoma. This is a pocket of blood that collects  under the surgery site.  · Infection at the surgery site.  · Numbness at the surgery site.  · Swelling and bruising.  · Blood clots.  · Intestinal damage (rare).  · Scarring.  · Skin damage.  · Development of another hernia. This may require another surgery.  BEFORE THE PROCEDURE  · Ask your health care provider about changing or stopping your regular medicines. You may need to stop taking aspirin, nonsteroidal anti-inflammatory drugs (NSAIDs), vitamin E, and blood thinners as early as 2 weeks before the procedure.  · Do not eat or drink for 8 hours before the procedure, or as directed by your health care provider.  · You might be asked to shower or wash with an antibacterial soap before the procedure.  · Wear comfortable clothes that will be easy to put on after the procedure.  PROCEDURE  You will be given an intravenous (IV) tube. A needle will be inserted in your arm. Medicine will flow directly into your body through this needle. You might be given medicine to help you relax (sedative). You will be given medicine that numbs the area (local anesthetic) or medicine that makes you sleep (general anesthetic).  If you have open surgery:  · The surgeon will make a cut (incision) in your abdomen.  · The gap in the muscle wall will be repaired. The surgeon may sew the edges together over the gap or use a mesh material to strengthen the area. When mesh is used, the body grows new, strong tissue into and around it. This new tissue closes the gap.  · The surgeon will close the incision.  If you have laparoscopic surgery:  · The surgeon will make several small incisions in your abdomen.  · A thin, lighted tube (laparoscope) will be inserted into the abdomen through an incision. A camera is attached to the laparoscope that allows the surgeon to see inside the abdomen.  · Tools will be inserted through the other incisions to repair the hernia. Usually, mesh is used to cover the gap.  · The surgeon will close the incisions  with stitches.  AFTER THE PROCEDURE  · You will be taken to a recovery area. A nurse will watch and check your progress.  · When you are awake, feeling well, and taking fluids well, you may be allowed to go home. In some cases, you may need to stay overnight in the hospital.  · Arrange for someone to drive you home.     This information is not intended to replace advice given to you by your health care provider. Make sure you discuss any questions you have with your health care provider.     Document Released: 03/16/2010 Document Revised: 01/08/2016 Document Reviewed: 03/20/2013  [employer] Interactive Patient Education ©2016 [employer]       SYMPTOMS OF A STROKE    Call 911 or have someone take you to the Emergency Department if you have any of the following:    · Sudden numbness or weakness of your face, arm or leg especially on one side of the body  · Sudden confusion, diffiiculty speaking or trouble understanding   · Changes in your vision or loss of sight in one eye  · Sudden severe headache with no known cause  · sudden dizziness, trouble walking, loss of balance or coordination    It is important to seek emergency care right away if you have further stroke symptoms. If you get emergency help quickly, the powerful clot-dissolving medicines can reduce the disabilities caused by a stroke.     For more information:    American Stroke Association  5-788-5-STROKE  www.strokeassociation.org           IF YOU SMOKE OR USE TOBACCO PLEASE READ THE FOLLOWING:    Why is smoking bad for me?  Smoking increases the risk of heart disease, lung disease, vascular disease, stroke, and cancer.     If you smoke, STOP!    If you would like more information on quitting smoking, please visit the [employer] website: [email or web address]   This link will provide additional resources including the QUIT line and the [employer] the [employer] support groups.     For more information:    American Cancer  Society  (117) 653-8639    American Heart Association  1-103.236.3915

## 2017-01-20 ENCOUNTER — ANESTHESIA (OUTPATIENT)
Dept: PERIOP | Facility: HOSPITAL | Age: 47
End: 2017-01-20

## 2017-01-20 ENCOUNTER — HOSPITAL ENCOUNTER (OUTPATIENT)
Facility: HOSPITAL | Age: 47
Setting detail: HOSPITAL OUTPATIENT SURGERY
Discharge: HOME | End: 2017-01-20
Attending: SURGERY | Admitting: SURGERY

## 2017-01-20 VITALS
WEIGHT: 251 LBS | HEART RATE: 71 BPM | RESPIRATION RATE: 16 BRPM | SYSTOLIC BLOOD PRESSURE: 120 MMHG | OXYGEN SATURATION: 96 % | BODY MASS INDEX: 35.14 KG/M2 | TEMPERATURE: 97.9 F | HEIGHT: 71 IN | DIASTOLIC BLOOD PRESSURE: 89 MMHG

## 2017-01-20 DIAGNOSIS — K42.9 UMBILICAL HERNIA WITHOUT OBSTRUCTION AND WITHOUT GANGRENE: ICD-10-CM

## 2017-01-20 PROCEDURE — 25010000002 HYDROMORPHONE PER 4 MG: Performed by: NURSE ANESTHETIST, CERTIFIED REGISTERED

## 2017-01-20 PROCEDURE — 25010000002 MIDAZOLAM PER 1 MG: Performed by: NURSE ANESTHETIST, CERTIFIED REGISTERED

## 2017-01-20 PROCEDURE — 25010000002 PROPOFOL 10 MG/ML EMULSION: Performed by: NURSE ANESTHETIST, CERTIFIED REGISTERED

## 2017-01-20 PROCEDURE — 25010000002 FENTANYL CITRATE (PF) 100 MCG/2ML SOLUTION: Performed by: NURSE ANESTHETIST, CERTIFIED REGISTERED

## 2017-01-20 PROCEDURE — 49587 PR REPAIR UMBILICAL HERN,5+Y/O,STRANG: CPT | Performed by: SURGERY

## 2017-01-20 PROCEDURE — 25010000002 HYDROMORPHONE PER 4 MG

## 2017-01-20 PROCEDURE — 25010000002 ONDANSETRON PER 1 MG: Performed by: NURSE ANESTHETIST, CERTIFIED REGISTERED

## 2017-01-20 RX ORDER — MIDAZOLAM HYDROCHLORIDE 1 MG/ML
1 INJECTION INTRAMUSCULAR; INTRAVENOUS
Status: DISCONTINUED | OUTPATIENT
Start: 2017-01-20 | End: 2017-01-20 | Stop reason: HOSPADM

## 2017-01-20 RX ORDER — ONDANSETRON 2 MG/ML
4 INJECTION INTRAMUSCULAR; INTRAVENOUS ONCE AS NEEDED
Status: DISCONTINUED | OUTPATIENT
Start: 2017-01-20 | End: 2017-01-20 | Stop reason: HOSPADM

## 2017-01-20 RX ORDER — HYDROMORPHONE HCL 110MG/55ML
PATIENT CONTROLLED ANALGESIA SYRINGE INTRAVENOUS
Status: COMPLETED
Start: 2017-01-20 | End: 2017-01-20

## 2017-01-20 RX ORDER — FENTANYL CITRATE 50 UG/ML
INJECTION, SOLUTION INTRAMUSCULAR; INTRAVENOUS AS NEEDED
Status: DISCONTINUED | OUTPATIENT
Start: 2017-01-20 | End: 2017-01-20 | Stop reason: SURG

## 2017-01-20 RX ORDER — OXYCODONE HYDROCHLORIDE AND ACETAMINOPHEN 5; 325 MG/1; MG/1
TABLET ORAL
Status: COMPLETED
Start: 2017-01-20 | End: 2017-01-20

## 2017-01-20 RX ORDER — MAGNESIUM HYDROXIDE 1200 MG/15ML
LIQUID ORAL AS NEEDED
Status: DISCONTINUED | OUTPATIENT
Start: 2017-01-20 | End: 2017-01-20 | Stop reason: HOSPADM

## 2017-01-20 RX ORDER — MIDAZOLAM HYDROCHLORIDE 1 MG/ML
2 INJECTION INTRAMUSCULAR; INTRAVENOUS
Status: DISCONTINUED | OUTPATIENT
Start: 2017-01-20 | End: 2017-01-20 | Stop reason: HOSPADM

## 2017-01-20 RX ORDER — OXYCODONE HYDROCHLORIDE AND ACETAMINOPHEN 5; 325 MG/1; MG/1
1 TABLET ORAL AS NEEDED
Status: CANCELLED | OUTPATIENT
Start: 2017-01-20 | End: 2017-01-21

## 2017-01-20 RX ORDER — SODIUM CHLORIDE, SODIUM LACTATE, POTASSIUM CHLORIDE, CALCIUM CHLORIDE 600; 310; 30; 20 MG/100ML; MG/100ML; MG/100ML; MG/100ML
9 INJECTION, SOLUTION INTRAVENOUS CONTINUOUS
Status: DISCONTINUED | OUTPATIENT
Start: 2017-01-20 | End: 2017-01-20 | Stop reason: HOSPADM

## 2017-01-20 RX ORDER — PROPOFOL 10 MG/ML
VIAL (ML) INTRAVENOUS AS NEEDED
Status: DISCONTINUED | OUTPATIENT
Start: 2017-01-20 | End: 2017-01-20 | Stop reason: SURG

## 2017-01-20 RX ORDER — LIDOCAINE HYDROCHLORIDE 20 MG/ML
INJECTION, SOLUTION INFILTRATION; PERINEURAL AS NEEDED
Status: DISCONTINUED | OUTPATIENT
Start: 2017-01-20 | End: 2017-01-20 | Stop reason: SURG

## 2017-01-20 RX ORDER — SODIUM CHLORIDE 0.9 % (FLUSH) 0.9 %
1-10 SYRINGE (ML) INJECTION AS NEEDED
Status: DISCONTINUED | OUTPATIENT
Start: 2017-01-20 | End: 2017-01-20 | Stop reason: HOSPADM

## 2017-01-20 RX ORDER — HYDROMORPHONE HCL 110MG/55ML
1 PATIENT CONTROLLED ANALGESIA SYRINGE INTRAVENOUS
Status: DISCONTINUED | OUTPATIENT
Start: 2017-01-20 | End: 2017-01-20 | Stop reason: HOSPADM

## 2017-01-20 RX ORDER — MEPERIDINE HYDROCHLORIDE 25 MG/ML
12.5 INJECTION INTRAMUSCULAR; INTRAVENOUS; SUBCUTANEOUS
Status: DISCONTINUED | OUTPATIENT
Start: 2017-01-20 | End: 2017-01-20 | Stop reason: HOSPADM

## 2017-01-20 RX ORDER — FAMOTIDINE 10 MG/ML
20 INJECTION, SOLUTION INTRAVENOUS ONCE
Status: COMPLETED | OUTPATIENT
Start: 2017-01-20 | End: 2017-01-20

## 2017-01-20 RX ORDER — SODIUM CHLORIDE, SODIUM LACTATE, POTASSIUM CHLORIDE, CALCIUM CHLORIDE 600; 310; 30; 20 MG/100ML; MG/100ML; MG/100ML; MG/100ML
100 INJECTION, SOLUTION INTRAVENOUS CONTINUOUS
Status: DISCONTINUED | OUTPATIENT
Start: 2017-01-20 | End: 2017-01-20 | Stop reason: HOSPADM

## 2017-01-20 RX ORDER — LIDOCAINE HYDROCHLORIDE 10 MG/ML
0.3 INJECTION, SOLUTION EPIDURAL; INFILTRATION; INTRACAUDAL; PERINEURAL ONCE
Status: COMPLETED | OUTPATIENT
Start: 2017-01-20 | End: 2017-01-20

## 2017-01-20 RX ORDER — ONDANSETRON 2 MG/ML
4 INJECTION INTRAMUSCULAR; INTRAVENOUS ONCE AS NEEDED
Status: COMPLETED | OUTPATIENT
Start: 2017-01-20 | End: 2017-01-20

## 2017-01-20 RX ORDER — BUPIVACAINE HYDROCHLORIDE 2.5 MG/ML
INJECTION, SOLUTION INFILTRATION; PERINEURAL AS NEEDED
Status: DISCONTINUED | OUTPATIENT
Start: 2017-01-20 | End: 2017-01-20 | Stop reason: HOSPADM

## 2017-01-20 RX ADMIN — LIDOCAINE HYDROCHLORIDE 100 MG: 20 INJECTION, SOLUTION INFILTRATION; PERINEURAL at 08:18

## 2017-01-20 RX ADMIN — LIDOCAINE HYDROCHLORIDE 0.3 ML: 10 INJECTION, SOLUTION EPIDURAL; INFILTRATION; INTRACAUDAL; PERINEURAL at 08:02

## 2017-01-20 RX ADMIN — FENTANYL CITRATE 100 MCG: 50 INJECTION, SOLUTION INTRAMUSCULAR; INTRAVENOUS at 08:17

## 2017-01-20 RX ADMIN — FAMOTIDINE 20 MG: 10 INJECTION, SOLUTION INTRAVENOUS at 08:02

## 2017-01-20 RX ADMIN — PROPOFOL 150 MG: 10 INJECTION, EMULSION INTRAVENOUS at 08:18

## 2017-01-20 RX ADMIN — SODIUM CHLORIDE, POTASSIUM CHLORIDE, SODIUM LACTATE AND CALCIUM CHLORIDE 9 ML/HR: 600; 310; 30; 20 INJECTION, SOLUTION INTRAVENOUS at 08:01

## 2017-01-20 RX ADMIN — SODIUM CHLORIDE, POTASSIUM CHLORIDE, SODIUM LACTATE AND CALCIUM CHLORIDE: 600; 310; 30; 20 INJECTION, SOLUTION INTRAVENOUS at 07:53

## 2017-01-20 RX ADMIN — HYDROMORPHONE HYDROCHLORIDE 1 MG: 2 INJECTION, SOLUTION INTRAMUSCULAR; INTRAVENOUS; SUBCUTANEOUS at 09:13

## 2017-01-20 RX ADMIN — MIDAZOLAM HYDROCHLORIDE 1 MG: 1 INJECTION, SOLUTION INTRAMUSCULAR; INTRAVENOUS at 08:02

## 2017-01-20 RX ADMIN — HYDROMORPHONE HYDROCHLORIDE 1 MG: 2 INJECTION, SOLUTION INTRAMUSCULAR; INTRAVENOUS; SUBCUTANEOUS at 09:24

## 2017-01-20 RX ADMIN — ONDANSETRON 4 MG: 2 INJECTION, SOLUTION INTRAMUSCULAR; INTRAVENOUS at 08:03

## 2017-01-20 RX ADMIN — OXYCODONE AND ACETAMINOPHEN 1 TABLET: 5; 325 TABLET ORAL at 10:13

## 2017-01-20 NOTE — IP AVS SNAPSHOT
AFTER VISIT SUMMARY             Robles Rodrigues           About your hospitalization     You were admitted on:  January 20, 2017 You last received care in the:  [facility] OR       Procedures & Surgeries      Procedure(s) (LRB):  UMBILICAL HERNIA REPAIR (N/A)     1/20/2017     Surgeon(s):  Ramo Medina MD  -------------------      Medications    If you or your caregiver advised us that you are currently taking a medication and that medication is marked below as “Resume”, this simply indicates that we have reviewed those medications to make sure our new therapy recommendations do not interfere.  If you have concerns about medications other than those new ones which we are prescribing today, please consult the physician who prescribed them (or your primary physician).  Our review of your home medications is not meant to indicate that we are directing their use.             Your Medications      CONTINUE taking these medications     aspirin 81 MG EC tablet   Take 81 mg by mouth Daily.           busPIRone 10 MG tablet   Take 10 mg by mouth 2 (Two) Times a Day As Needed (anxiety).   Commonly known as:  BUSPAR           citalopram 20 MG tablet   Take 20 mg by mouth Daily.   Commonly known as:  CeleXA           CRESTOR 10 MG tablet   Take 10 mg by mouth Every Night.   Generic drug:  rosuvastatin           dicyclomine 10 MG capsule   Take 10 mg by mouth 3 (Three) Times a Day Before Meals.   Commonly known as:  BENTYL           metoprolol succinate XL 25 MG 24 hr tablet   Take 25 mg by mouth 2 (two) times a day.   Commonly known as:  TOPROL-XL           omeprazole 20 MG capsule   Take 20 mg by mouth daily.   Commonly known as:  priLOSEC           RAPAFLO 8 MG capsule capsule   Take 8 mg by mouth Daily.   Generic drug:  silodosin                      Your Medications      Your Medication List           Morning Noon Evening Bedtime As Needed    aspirin 81 MG EC tablet   Take 81 mg by mouth Daily.                               busPIRone 10 MG tablet   Take 10 mg by mouth 2 (Two) Times a Day As Needed (anxiety).   Commonly known as:  BUSPAR                                citalopram 20 MG tablet   Take 20 mg by mouth Daily.   Commonly known as:  CeleXA                                CRESTOR 10 MG tablet   Take 10 mg by mouth Every Night.   Generic drug:  rosuvastatin                                dicyclomine 10 MG capsule   Take 10 mg by mouth 3 (Three) Times a Day Before Meals.   Commonly known as:  BENTYL                                metoprolol succinate XL 25 MG 24 hr tablet   Take 25 mg by mouth 2 (two) times a day.   Commonly known as:  TOPROL-XL                                omeprazole 20 MG capsule   Take 20 mg by mouth daily.   Commonly known as:  priLOSEC                                RAPAFLO 8 MG capsule capsule   Take 8 mg by mouth Daily.   Generic drug:  silodosin                                         Instructions for After Discharge        Activity Instructions     Discharge Activity       Patient may shower tomorrow.  No lifting more than 5 pounds ×6 weeks.             Discharge References/Attachments     OPEN HERNIA REPAIR, CARE AFTER, EASY-TO-READ (ENGLISH)    GENERAL ANESTHESIA, ADULT, CARE AFTER (ENGLISH)    ACETAMINOPHEN; HYDROCODONE TABLETS OR CAPSULES (ENGLISH)       Follow-ups for After Discharge        Follow-up Information     Follow up with Aron White MD .    Specialty:  Family Medicine    Contact information:    [address] [address] [phone]        Referrals and Follow-ups to Schedule     Return to Clinic for Follow Up    As directed    Follow Up Details:  Dr. Medina next week             MyChart Signup     Our records indicate that you have declined [facility] MyChart signup. If you would like to sign up for [employer], please email [email or web address] or call [phone] to obtain an activation code.         Summary of Your Hospitalization         Reason for Hospitalization     Your primary diagnosis was:  Not on File      Care Providers     Provider Service Role Specialty    Ramo Medina MD -- Attending Provider General Surgery    Ramo Medina MD -- Surgeon  General Surgery      Your Allergies  Date Reviewed: 1/20/2017    Allergen Reactions    Influenza Vaccines Other (See Comments)    Swelling at injection site      Patient Belongings Returned     Document Return of Belongings Flowsheet     Were the patient bedside belongings sent home?   --   Belongings Retrieved from Security & Sent Home   --    Belongings Sent to Safe   --   Medications Retrieved from Pharmacy & Sent Home   --              More Information      Open Hernia Repair, Care After  These instructions give you information about caring for yourself after your procedure. Your doctor may also give you more specific instructions. Call your doctor if you have any problems or questions after your procedure.  HOME CARE  · Keep the cut (incision) area clean and dry. You may gently wash the incision area with soap and water 48 hours after surgery. To dry the incision area, gently blot or dab it.  · Do not take baths, swim, or use a hot tub for 10 days or until your doctor approves.  · Change bandages (dressings) as told by your doctor.  · Check your incision area every day for signs of infection. Watch for:    Redness, swelling, or pain.    Fluid , blood, or pus.  · Eat plenty of fruits and vegetables. This helps to prevent constipation.  · Drink enough fluid to keep your pee (urine) clear or pale yellow. This also helps to prevent constipation.  · Do not drive or operate heavy machinery until your doctor says it is okay.  · Do not lift anything that is heavier than 10 lb (4.5 kg) until your doctor approves.  · Do not play contact sports for 4 weeks or until your doctor approves.  · Take medicines only as told by your doctor.  · Keep all follow-up visits as told by your doctor. This is  important. Ask your doctor when to make an appointment to have your stitches (sutures) or staples removed.  GET HELP IF:  · The incision is bleeding more than before.  · You have blood in your poop (stool).  · The incision hurts more than before.  · You have redness, swelling, or pain in your incision area.  · You have fluid, blood, or pus coming from your incision.  · You have a fever.  · You notice a bad smell coming from the incision area or the dressing.  GET HELP RIGHT AWAY IF:  · You have a rash.  · Your chest hurts.  · You are short of breath.  · You feel light-headed.  · You feel weak and dizzy (feel faint).     This information is not intended to replace advice given to you by your health care provider. Make sure you discuss any questions you have with your health care provider.     Document Released: 01/08/2016 Document Reviewed: 01/08/2016  [employer] Interactive Patient Education ©2016 Elsevier Inc.          General Anesthesia, Adult, Care After  Refer to this sheet in the next few weeks. These instructions provide you with information on caring for yourself after your procedure. Your health care provider may also give you more specific instructions. Your treatment has been planned according to current medical practices, but problems sometimes occur. Call your health care provider if you have any problems or questions after your procedure.  WHAT TO EXPECT AFTER THE PROCEDURE  After the procedure, it is typical to experience:  · Sleepiness.  · Nausea and vomiting.  HOME CARE INSTRUCTIONS  · For the first 24 hours after general anesthesia:  ¨ Have a responsible person with you.  ¨ Do not drive a car. If you are alone, do not take public transportation.  ¨ Do not drink alcohol.  ¨ Do not take medicine that has not been prescribed by your health care provider.  ¨ Do not sign important papers or make important decisions.  ¨ You may resume a normal diet and activities as directed by your health care  provider.  · Change bandages (dressings) as directed.  · If you have questions or problems that seem related to general anesthesia, call the hospital and ask for the anesthetist or anesthesiologist on call.  SEEK MEDICAL CARE IF:  · You have nausea and vomiting that continue the day after anesthesia.  · You develop a rash.  SEEK IMMEDIATE MEDICAL CARE IF:   · You have difficulty breathing.  · You have chest pain.  · You have any allergic problems.     This information is not intended to replace advice given to you by your health care provider. Make sure you discuss any questions you have with your health care provider.     Document Released: 03/26/2002 Document Revised: 01/08/2016 Document Reviewed: 04/17/2013  [employer] Interactive Patient Education ©2016 Elsevier Inc.          Acetaminophen; Hydrocodone tablets or capsules  What is this medicine?  ACETAMINOPHEN; HYDROCODONE (a set a JENNIFER franky fen; wero droe KOE done) is a pain reliever. It is used to treat moderate to severe pain.  This medicine may be used for other purposes; ask your health care provider or pharmacist if you have questions.  What should I tell my health care provider before I take this medicine?  They need to know if you have any of these conditions:  -brain tumor  -Crohn's disease, inflammatory bowel disease, or ulcerative colitis  -drug abuse or addiction  -head injury  -heart or circulation problems  -if you often drink alcohol  -kidney disease or problems going to the bathroom  -liver disease  -lung disease, asthma, or breathing problems  -an unusual or allergic reaction to acetaminophen, hydrocodone, other opioid analgesics, other medicines, foods, dyes, or preservatives  -pregnant or trying to get pregnant  -breast-feeding  How should I use this medicine?  Take this medicine by mouth. Swallow it with a full glass of water. Follow the directions on the prescription label. If the medicine upsets your stomach, take the medicine with food or milk.  Do not take more than you are told to take.  Talk to your pediatrician regarding the use of this medicine in children. This medicine is not approved for use in children.  Patients over 65 years may have a stronger reaction and need a smaller dose.  Overdosage: If you think you have taken too much of this medicine contact a poison control center or emergency room at once.  NOTE: This medicine is only for you. Do not share this medicine with others.  What if I miss a dose?  If you miss a dose, take it as soon as you can. If it is almost time for your next dose, take only that dose. Do not take double or extra doses.  What may interact with this medicine?  -alcohol  -antihistamines  -isoniazid  -medicines for depression, anxiety, or psychotic disturbances  -medicines for sleep  -muscle relaxants  -naltrexone  -narcotic medicines (opiates) for pain  -phenobarbital  -ritonavir  -tramadol  This list may not describe all possible interactions. Give your health care provider a list of all the medicines, herbs, non-prescription drugs, or dietary supplements you use. Also tell them if you smoke, drink alcohol, or use illegal drugs. Some items may interact with your medicine.  What should I watch for while using this medicine?  Tell your doctor or health care professional if your pain does not go away, if it gets worse, or if you have new or a different type of pain. You may develop tolerance to the medicine. Tolerance means that you will need a higher dose of the medicine for pain relief. Tolerance is normal and is expected if you take the medicine for a long time.  Do not suddenly stop taking your medicine because you may develop a severe reaction. Your body becomes used to the medicine. This does NOT mean you are addicted. Addiction is a behavior related to getting and using a drug for a non-medical reason. If you have pain, you have a medical reason to take pain medicine. Your doctor will tell you how much medicine to  take. If your doctor wants you to stop the medicine, the dose will be slowly lowered over time to avoid any side effects.  You may get drowsy or dizzy when you first start taking the medicine or change doses. Do not drive, use machinery, or do anything that may be dangerous until you know how the medicine affects you. Stand or sit up slowly.  There are different types of narcotic medicines (opiates) for pain. If you take more than one type at the same time, you may have more side effects. Give your health care provider a list of all medicines you use. Your doctor will tell you how much medicine to take. Do not take more medicine than directed. Call emergency for help if you have problems breathing.  The medicine will cause constipation. Try to have a bowel movement at least every 2 to 3 days. If you do not have a bowel movement for 3 days, call your doctor or health care professional.  Too much acetaminophen can be very dangerous. Do not take Tylenol (acetaminophen) or medicines that contain acetaminophen with this medicine. Many non-prescription medicines contain acetaminophen. Always read the labels carefully.  What side effects may I notice from receiving this medicine?  Side effects that you should report to your doctor or health care professional as soon as possible:  -allergic reactions like skin rash, itching or hives, swelling of the face, lips, or tongue  -breathing problems  -confusion  -feeling faint or lightheaded, falls  -stomach pain  -yellowing of the eyes or skin  Side effects that usually do not require medical attention (report to your doctor or health care professional if they continue or are bothersome):  -nausea, vomiting  -stomach upset  This list may not describe all possible side effects. Call your doctor for medical advice about side effects. You may report side effects to FDA at 6-799-FDA-3167.  Where should I keep my medicine?  Keep out of the reach of children. This medicine can be  abused. Keep your medicine in a safe place to protect it from theft. Do not share this medicine with anyone. Selling or giving away this medicine is dangerous and against the law.  This medicine may cause accidental overdose and death if it taken by other adults, children, or pets. Mix any unused medicine with a substance like cat litter or coffee grounds. Then throw the medicine away in a sealed container like a sealed bag or a coffee can with a lid. Do not use the medicine after the expiration date.  Store at room temperature between 15 and 30 degrees C (59 and 86 degrees F).  NOTE: This sheet is a summary. It may not cover all possible information. If you have questions about this medicine, talk to your doctor, pharmacist, or health care provider.     © 2016, Elsevier/Gold Standard. (2015-11-18 15:29:20)         PREVENTING SURGICAL SITE INFECTIONS     Surgical Site Infections FAQs  What is a Surgical Site Infection (SSI)?  A surgical site infection is an infection that occurs after surgery in the part of the body where the surgery took place. Most patients who have surgery do not develop an infection. However, infections develop in about 1 to 3 out of every 100 patients who have surgery.  Some of the common symptoms of a surgical site infection are:  · Redness and pain around the area where you had surgery  · Drainage of cloudy fluid from your surgical wound  · Fever  Can SSIs be treated?  Yes. Most surgical site infections can be treated with antibiotics. The antibiotic given to you depends on the bacteria (germs) causing the infection. Sometimes patients with SSIs also need another surgery to treat the infection.  What are some of the things that hospitals are doing to prevent SSIs?  To prevent SSIs, doctors, nurses, and other healthcare providers:  · Clean their hands and arms up to their elbows with an antiseptic agent just before the surgery.  · Clean their hands with soap and water or an alcohol-based hand  rub before and after caring for each patient.  · May remove some of your hair immediately before your surgery using electric clippers if the hair is in the same area where the procedure will occur. They should not shave you with a razor.  · Wear special hair covers, masks, gowns, and gloves during surgery to keep the surgery area clean.  · Give you antibiotics before your surgery starts. In most cases, you should get antibiotics within 60 minutes before the surgery starts and the antibiotics should be stopped within 24 hours after surgery.  · Clean the skin at the site of your surgery with a special soap that kills germs.  What can I do to help prevent SSIs?  Before your surgery:  · Tell your doctor about other medical problems you may have. Health problems such as allergies, diabetes, and obesity could affect your surgery and your treatment.  · Quit smoking. Patients who smoke get more infections. Talk to your doctor about how you can quit before your surgery.  · Do not shave near where you will have surgery. Shaving with a razor can irritate your skin and make it easier to develop an infection.  At the time of your surgery:  · Speak up if someone tries to shave you with a razor before surgery. Ask why you need to be shaved and talk with your surgeon if you have any concerns.  · Ask if you will get antibiotics before surgery.  After your surgery:  · Make sure that your healthcare providers clean their hands before examining you, either with soap and water or an alcohol-based hand rub.    If you do not see your providers clean their hands, please ask them to do so.  · Family and friends who visit you should not touch the surgical wound or dressings.  · Family and friends should clean their hands with soap and water or an alcohol-based hand rub before and after visiting you. If you do not see them clean their hands, ask them to clean their hands.  What do I need to do when I go home from the hospital?  · Before you  go home, your doctor or nurse should explain everything you need to know about taking care of your wound. Make sure you understand how to care for your wound before you leave the hospital.  · Always clean your hands before and after caring for your wound.  · Before you go home, make sure you know who to contact if you have questions or problems after you get home.  · If you have any symptoms of an infection, such as redness and pain at the surgery site, drainage, or fever, call your doctor immediately.  If you have additional questions, please ask your doctor or nurse.  Developed and co-sponsored by The Society for Healthcare Epidemiology of [place] (SHEA); Infectious Diseases Society of [place] (IDSA); American Hospital Association; Association for Professionals in Infection Control and Epidemiology (APIC); Centers for Disease Control and Prevention (CDC); and The Joint Commission.     This information is not intended to replace advice given to you by your health care provider. Make sure you discuss any questions you have with your health care provider.     Document Released: 12/23/2014 Document Revised: 01/08/2016 Document Reviewed: 03/02/2016  [employer] Interactive Patient Education ©2016 [employer].             SYMPTOMS OF A STROKE    Call 911 or have someone take you to the Emergency Department if you have any of the following:    · Sudden numbness or weakness of your face, arm or leg especially on one side of the body  · Sudden confusion, diffiiculty speaking or trouble understanding   · Changes in your vision or loss of sight in one eye  · Sudden severe headache with no known cause  · sudden dizziness, trouble walking, loss of balance or coordination    It is important to seek emergency care right away if you have further stroke symptoms. If you get emergency help quickly, the powerful clot-dissolving medicines can reduce the disabilities caused by a stroke.     For more information:    American Stroke  Association  0-758-3-STROKE  www.strokeassociation.org           IF YOU SMOKE OR USE TOBACCO PLEASE READ THE FOLLOWING:    Why is smoking bad for me?  Smoking increases the risk of heart disease, lung disease, vascular disease, stroke, and cancer.     If you smoke, STOP!    If you would like more information on quitting smoking, please visit the [employer] website: [email or web address]   This link will provide additional resources including the QUIT line and the Beat the Pack support groups.     For more information:    American Cancer Society  [phone]    American Heart Association  1-331.748.4132               YOU ARE THE MOST IMPORTANT FACTOR IN YOUR RECOVERY.     Follow all instructions carefully.     I have reviewed my discharge instructions with my nurse, including the following information, if applicable:     Information about my illness and diagnosis   Follow up appointments (including lab draws)   Wound Care   Equipment Needs   Medications (new and continuing) along with side effects   Preventative information such as vaccines and smoking cessations   Diet   Pain   I know when to contact my Doctor's office or seek emergency care      I want my nurse to describe the side effects of my medications: YES NO   If the answer is no, I understand the side effects of my medications: YES NO   My nurse described the side effects of my medications in a way that I could understand: YES NO   I have taken my personal belongings and my own medications with me at discharge: YES NO            I have received this information and my questions have been answered. I have discussed any concerns I see with this plan with the nurse or physician. I understand these instructions.    Signature of Patient or Responsible Person: _____________________________________    Date: _________________  Time: __________________    Signature of Healthcare Provider:  _______________________________________  Date: _________________  Time: __________________

## 2017-01-20 NOTE — H&P (VIEW-ONLY)
PATIENT INFORMATION  Robles Rodrigues  FU UMB HERNIA, PT STATES AREA IS MORE PAINFUL     - 1970    CHIEF COMPLAINT  Chief Complaint   Patient presents with   • Follow-up       HISTORY OF PRESENT ILLNESS  HPI is upper abdominal pain has resolved on the Librax.  He has had recent endoscopy by Dr. Galloway.  He complains of daily periumbilical pain.  He denies any nausea or vomiting.  He did have a CT scan performed that showed a small umbilical hernia.        REVIEW OF SYSTEMS  Review of Systems   Gastrointestinal: Positive for abdominal pain.         ACTIVE PROBLEMS  Patient Active Problem List    Diagnosis   • Fast heart beat [R00.0]   • Tachycardia [R00.0]   • Edema [R60.9]   • Benign essential hypertension [I10]   • Chronic coronary artery disease [I25.10]     Overview Note:     Overview:   #1 normal nuclear stress test and normal 2-D Doppler echo 2014  #2 CT scan of the chest demonstrating coronary calcium 2014     • Hyperlipidemia [E78.5]         PAST MEDICAL HISTORY  Past Medical History   Diagnosis Date   • GERD (gastroesophageal reflux disease)    • Hyperlipidemia    • Hypertension    • Obesity    • Tachycardia          SURGICAL HISTORY  Past Surgical History   Procedure Laterality Date   • Cholecystectomy     • Urethral dilation     • Endoscopy N/A 2016     Procedure: ESOPHAGOGASTRODUODENOSCOPY w/ biopsies;  Surgeon: Laura Galloway MD;  Location: [facility] OR;  Service:    • Colonoscopy N/A 2016     Procedure: COLONOSCOPY w/ polypectomy;  Surgeon: Laura Galloway MD;  Location: [facility] OR;  Service:          FAMILY HISTORY  Family History   Problem Relation Age of Onset   • Depression Father    • Coronary artery disease Father    • Hypertension Father    • Heart disease Father    • Hypertension Sister    • Hypertension Brother          SOCIAL HISTORY  Social History     Occupational History   • Not on file.     Social History Main Topics   • Smoking status: Never Smoker   •  "Smokeless tobacco: Never Used   • Alcohol use No   • Drug use: No   • Sexual activity: Defer         CURRENT MEDICATIONS    Current Outpatient Prescriptions:   •  aspirin 81 MG EC tablet, Take 81 mg by mouth Daily., Disp: , Rfl:   •  busPIRone (BUSPAR) 10 MG tablet, Take 10 mg by mouth 2 (two) times a day., Disp: , Rfl:   •  citalopram (CeleXA) 20 MG tablet, Take 20 mg by mouth Daily., Disp: , Rfl:   •  clidinium-chlordiazePOXIDE (LIBRAX) 5-2.5 MG per capsule, Take 1 capsule by mouth 3 (Three) Times a Day With Meals., Disp: 90 capsule, Rfl: 0  •  hydrocortisone (ANUSOL-HC) 25 MG suppository, Insert 1 suppository into the rectum 2 (Two) Times a Day As Needed for hemorrhoids (rectal discomfort)., Disp: 30 suppository, Rfl: 0  •  metoprolol succinate XL (TOPROL-XL) 25 MG 24 hr tablet, Take 25 mg by mouth 2 (two) times a day., Disp: , Rfl:   •  omeprazole (PriLOSEC) 20 MG capsule, Take 20 mg by mouth daily., Disp: , Rfl:   •  rosuvastatin (CRESTOR) 10 MG tablet, Take  by mouth daily., Disp: , Rfl:   •  silodosin (RAPAFLO) 8 MG capsule capsule, Take  by mouth daily., Disp: , Rfl:     ALLERGIES  Review of patient's allergies indicates no known allergies.    VITALS  Vitals:    01/18/17 1530   BP: 122/82   Weight: 252 lb (114 kg)   Height: 73\" (185.4 cm)       LAST RESULTS   Admission on 12/14/2016, Discharged on 12/14/2016   Component Date Value Ref Range Status   • Case Report 12/14/2016    Final                    Value:Surgical Pathology Report                         Case: [id number]                                  Authorizing Provider:  Laura Galloway MD          Collected:           12/14/2016 01:01 PM          Ordering Location:     [facility]   Received:            12/14/2016 05:07 PM                                 OR                                                                           Pathologist:           Aditi Castillo MD                                                     Specimens: "   1) - Small Intestine, Duodenum, small bowel biopsy                                                  2) - Stomach, gastric biopsy                                                                        3) - Large Intestine, Cecum, cecal polyp                                                            4) - Large Intestine, Left / Descending Colon, descending polyp                           • Final Diagnosis 12/14/2016    Final                    Value:This result contains rich text formatting which cannot be displayed here.     Fl Small Bowel Follow Through    Result Date: 1/11/2017  Narrative: Small bowel follow-through  INDICATION: Periumbilical abdominal pain for one week.  FINDINGS:  image demonstrates cholecystectomy clips. A small bowel follow-through was performed. Fluoroscopy time is 43 seconds. 16 total images were obtained, including the  views. Transit time through the small bowel is prompt with contrast reaching the colon within 40 minutes. The small bowel is uniformly normal in caliber. There is no evidence of bowel wall thickening. No mass effect is seen within the abdomen. Manual spot compression of multiple bowel loops is also normal. The terminal ileum is normal.      Impression: Normal small bowel follow-through.  This report was finalized on 1/11/2017 10:44 AM by Dr. Chandra Tadeo MD.        PHYSICAL EXAM  Physical Exam solar white male in no active distress.  He is oriented ×3.  His gait is normal.  His trachea is in the midline.  His heart shows regular rate and rhythm.  His lungs are clear and equal.  He has a small reducible umbilical hernia that is reducible.  I reviewed his CT scan myself that showed a small umbilical hernia.    ASSESSMENT  Umbilical hernia      PLAN  The risks benefits and options were discussed with the patient and his mother in detail.  We will proceed with an open repair at [facility] on January 20 on an outpatient  basis.

## 2017-01-20 NOTE — PLAN OF CARE
Problem: Infection, Risk/Actual (Adult)  Goal: Identify Related Risk Factors and Signs and Symptoms  Outcome: Ongoing (interventions implemented as appropriate)    01/20/17 0832   Infection, Risk/Actual   Infection, Risk/Actual: Related Risk Factors other (see comments)  (visible bed bugs on clothing)   Signs and Symptoms (Infection, Risk/Actual) other (see comments)  (visible bug bites scattered to skin)

## 2017-01-20 NOTE — PLAN OF CARE
Problem: Patient Care Overview (Adult)  Goal: Plan of Care Review  Outcome: Ongoing (interventions implemented as appropriate)    01/20/17 0827   Coping/Psychosocial Response Interventions   Plan Of Care Reviewed With patient   Patient Care Overview   Progress improving   Outcome Evaluation   Outcome Summary/Follow up Plan vss, ready for or       Goal: Adult Individualization and Mutuality  Outcome: Ongoing (interventions implemented as appropriate)    Problem: Perioperative Period (Adult)  Goal: Signs and Symptoms of Listed Potential Problems Will be Absent or Manageable (Perioperative Period)  Outcome: Ongoing (interventions implemented as appropriate)

## 2017-01-20 NOTE — PLAN OF CARE
Problem: Patient Care Overview (Adult)  Goal: Plan of Care Review  Outcome: Outcome(s) achieved Date Met:  01/20/17 01/20/17 1200   Coping/Psychosocial Response Interventions   Plan Of Care Reviewed With patient;mother   Patient Care Overview   Progress improving   Outcome Evaluation   Outcome Summary/Follow up Plan VSS, C/O MILD INCISIONAL PAIN, DRESSING CLEAN DRY AND INTACT, HAS VOIDED WITH 0ML PER BLADDER SCANNER, TAKING PO, READY FOR D/C HOME       Goal: Adult Individualization and Mutuality  Outcome: Outcome(s) achieved Date Met:  01/20/17    Problem: Perioperative Period (Adult)  Goal: Signs and Symptoms of Listed Potential Problems Will be Absent or Manageable (Perioperative Period)  Outcome: Outcome(s) achieved Date Met:  01/20/17

## 2017-01-20 NOTE — OP NOTE
Preoperative diagnosis umbilical hernia  Postoperative diagnosis incarcerated umbilical hernia  Procedure open repair of incarcerated umbilical hernia  Complications none  Drains none  Specimen none  Estimated blood loss 5 cc  Anesthesia Gen. via LMA  Surgeon Dr. Medina  Findings 1 cm fascial defect incarcerated preperitoneal fat  Procedure after satisfactory induction of general anesthesia via LMA the patient's abdomen was prepped and draped in sterile fashion.  Infraumbilical curvilinear skin incision was made carried out through skin and subcutaneous tissue.  Umbilicus was controlled in the subcutaneous tissue circumferentially.  Sac was divided at its base.  He had incarcerated preperitoneal fat.  He had a 1 cm fascial defect.  Fat was reduced back below the fascia.  Fascia was closed in interrupted simple fashion with use of 0 Ethibond placing all sutures and tying at completion.  Umbilicus was tacked down to the fascia in interrupted simple fashion with use of 3-0 Vicryl.  Subcutaneous tissue was closed in interrupted simple fashion with use of 3-0 Vicryl.  Skin was closed with 4-0 Monocryl running subcuticular stitch burying the knots.  Skin and subcutaneous tissue were locally infiltrated with quarter percent Marcaine without epinephrine.  Wound was cleaned and dried and sterilely dressed.  Patient tolerated the procedure well and was transferred to the recovery room in stable condition.  After is awake alert and tolerating by mouth intake is stable and has voided he'll be discharged home to follow up in my office next week call for problems.  Diet as tolerated.  No lifting more than 5 pounds.  He may shower 24 hours.  He was given a prescription for Norco 5/325 one to 2 by mouth every 4 hours when necessary pain 30 these were dispensed without refills.

## 2017-01-20 NOTE — PLAN OF CARE
Problem: Patient Care Overview (Adult)  Goal: Plan of Care Review    01/20/17 0917   Coping/Psychosocial Response Interventions   Plan Of Care Reviewed With patient   Patient Care Overview   Progress improving   Outcome Evaluation   Outcome Summary/Follow up Plan VSS, C/O INCISIONAL PAIN, PAIN MED. GIVEN WITH SOME RELIEF, TAKING PO, DRESSING CLEAN DRY AND INTACT, READY FOR PHASE 2

## 2017-01-20 NOTE — LETTER
Ramo Medina MD Physician Signed Surgery Op Note Date of Service: 1/20/2017  8:58 AM     Case ID: [id number] Case Time: 1/20/2017  8:32 AM Surgeon: Ramo Medina MD     Procedure: UMBILICAL HERNIA REPAIR Location: [facility] OR          []Hide copied text  []Hover for attribution information  Preoperative diagnosis umbilical hernia  Postoperative diagnosis incarcerated umbilical hernia  Procedure open repair of incarcerated umbilical hernia  Complications none  Drains none  Specimen none  Estimated blood loss 5 cc  Anesthesia Gen. via LMA  Surgeon Dr. Medina  Findings 1 cm fascial defect incarcerated preperitoneal fat  Procedure after satisfactory induction of general anesthesia via LMA the patient's abdomen was prepped and draped in sterile fashion. Infraumbilical curvilinear skin incision was made carried out through skin and subcutaneous tissue. Umbilicus was controlled in the subcutaneous tissue circumferentially. Sac was divided at its base. He had incarcerated preperitoneal fat. He had a 1 cm fascial defect. Fat was reduced back below the fascia. Fascia was closed in interrupted simple fashion with use of 0 Ethibond placing all sutures and tying at completion. Umbilicus was tacked down to the fascia in interrupted simple fashion with use of 3-0 Vicryl. Subcutaneous tissue was closed in interrupted simple fashion with use of 3-0 Vicryl. Skin was closed with 4-0 Monocryl running subcuticular stitch burying the knots. Skin and subcutaneous tissue were locally infiltrated with quarter percent Marcaine without epinephrine. Wound was cleaned and dried and sterilely dressed. Patient tolerated the procedure well and was transferred to the recovery room in stable condition. After is awake alert and tolerating by mouth intake is stable and has voided he'll be discharged home to follow up in my office next week call for problems. Diet as tolerated. No lifting more than 5 pounds. He may shower 24 hours. He was given a  prescription for Norco 5/325 one to 2 by mouth every 4 hours when necessary pain 30 these were dispensed without refills.

## 2017-01-20 NOTE — PLAN OF CARE
Problem: Patient Care Overview (Adult)  Goal: Plan of Care Review  Outcome: Ongoing (interventions implemented as appropriate)    01/20/17 0917   Coping/Psychosocial Response Interventions   Plan Of Care Reviewed With patient   Patient Care Overview   Progress improving   Outcome Evaluation   Outcome Summary/Follow up Plan VSS, C/O INCISIONAL PAIN, PAIN MED. GIVEN WITH SOME RELIEF, TAKING PO, DRESSING CLEAN DRY AND INTACT, READY FOR PROCEDURE       Goal: Adult Individualization and Mutuality  Outcome: Ongoing (interventions implemented as appropriate)    Problem: Perioperative Period (Adult)  Goal: Signs and Symptoms of Listed Potential Problems Will be Absent or Manageable (Perioperative Period)  Outcome: Ongoing (interventions implemented as appropriate)

## 2017-01-20 NOTE — ANESTHESIA POSTPROCEDURE EVALUATION
Patient: Robles Rodrigues    Procedure Summary     Date Anesthesia Start Anesthesia Stop Room / Location    01/20/17 0811 0904 [facility] OR 2 / [facility]       Procedure Diagnosis Surgeon Provider    UMBILICAL HERNIA REPAIR (N/A Abdomen) Umbilical hernia without obstruction and without gangrene  (Umbilical hernia without obstruction and without gangrene [K42.9]) MD Hellen Hall, ZULMA          Anesthesia Type: general  Last vitals  BP      Temp      Pulse     Resp      SpO2        Post Anesthesia Care and Evaluation    Patient location during evaluation: bedside  Patient participation: complete - patient participated  Level of consciousness: awake and alert  Pain management: adequate  Airway patency: patent  Anesthetic complications: No anesthetic complications  PONV Status: none  Cardiovascular status: acceptable  Respiratory status: acceptable  Hydration status: acceptable

## 2017-01-20 NOTE — ANESTHESIA PROCEDURE NOTES
Airway    General Information and Staff    Patient location during procedure: OR  CRNA: MAYRA ROBB    Indications and Patient Condition    Preoxygenated: yes  MILS maintained throughout  Mask difficulty assessment: 1 - vent by mask    Final Airway Details  Final airway type: supraglottic airway      Successful airway: unique  Size 5    Number of attempts at approach: 1

## 2017-01-20 NOTE — ANESTHESIA PREPROCEDURE EVALUATION
Anesthesia Evaluation     Patient summary reviewed and Nursing notes reviewed    No history of anesthetic complications   Airway   Mallampati: II  TM distance: >3 FB  Neck ROM: full  no difficulty expected  Dental      Pulmonary - negative pulmonary ROS    breath sounds clear to auscultation  Cardiovascular   Exercise tolerance: good (4-7 METS)  (+) hypertension poorly controlled,     ECG reviewed  Rhythm: regular  Rate: normal  ROS comment: SINUS RHYTHM  RSR' IN V1 OR V2, RIGHT VCD OR RVH  NO SIGNIFICANT CHANGE FROM PREVIOUS ECG  Electronically Signed by:  Rj Sexton ([facility]) 19-Jan-2017 19:29:13  Date and Time of Study: 2017-01-19 15:08:51    HLD    Neuro/Psych  (+) psychiatric history Anxiety,      ROS Comment: MILD MR  GI/Hepatic/Renal/Endo    (+) obesity,  GERD (never on an empty stomach.  Only after a big meal) poorly controlled,   Diabetes: BOARDERLINE.    Musculoskeletal (-) negative ROS    Abdominal   (+) obese,    Substance History - negative use     OB/GYN          Other - negative ROS                          Anesthesia Plan    ASA 2     general     intravenous induction   Anesthetic plan and risks discussed with patient.  Use of blood products discussed with patient  Consented to blood products.

## 2017-01-25 ENCOUNTER — OFFICE VISIT (OUTPATIENT)
Dept: SURGERY | Facility: CLINIC | Age: 47
End: 2017-01-25

## 2017-01-25 DIAGNOSIS — Z09 SURGICAL FOLLOW-UP CARE: Primary | ICD-10-CM

## 2017-01-25 PROCEDURE — 99024 POSTOP FOLLOW-UP VISIT: CPT | Performed by: SURGERY

## 2017-01-25 NOTE — PROGRESS NOTES
5 days s/p umb hernia repair, c/o soreness but otherwise well  He complains of some incisional soreness.  He is otherwise well.  His incision is intact and there is no impulse.  There is mild ecchymosis.  I will see him back in 1 month.

## 2017-01-25 NOTE — MR AVS SNAPSHOT
Robles Rodrigues   1/25/2017 2:15 PM   Office Visit    Dept Phone:  [phone]   Encounter #:  [id number]    Provider:  Ramo Medina MD   Department:  [facility] GENERAL SURGERY                Your Full Care Plan              Your Updated Medication List          This list is accurate as of: 1/25/17  2:40 PM.  Always use your most recent med list.                aspirin 81 MG EC tablet       busPIRone 10 MG tablet   Commonly known as:  BUSPAR       citalopram 20 MG tablet   Commonly known as:  CeleXA       CRESTOR 10 MG tablet   Generic drug:  rosuvastatin       dicyclomine 10 MG capsule   Commonly known as:  BENTYL       metoprolol succinate XL 25 MG 24 hr tablet   Commonly known as:  TOPROL-XL       omeprazole 20 MG capsule   Commonly known as:  priLOSEC       RAPAFLO 8 MG capsule capsule   Generic drug:  silodosin               You Were Diagnosed With        Codes Comments    Surgical follow-up care    -  Primary ICD-10-CM: Z09  ICD-9-CM: V67.00       Instructions     None    Patient Instructions History      Upcoming Appointments     Visit Type Date Time Department    POST-OP 1/25/2017  2:15 PM [facility]    POST-OP 2/22/2017  1:30 PM [facility]      MyChart Signup     Our records indicate that you have declined [facility] MyChart signup. If you would like to sign up for [facility], please email [email or web address] or call [phone] to obtain an activation code.             Other Info from Your Visit           Your Appointments     Feb 22, 2017  1:30 PM EST   Post-Op with Ramo Medina MD   [facility] GENERAL SURGERY (--)    [address]  [place] [id number] [phone]              Allergies     Influenza Vaccines  Other (See Comments)    Swelling at injection site      Reason for Visit     Post-op Follow-up           Vital Signs     Smoking Status                   Never Smoker           Problems and Diagnoses Noted     Surgical follow-up care    -  Primary

## 2017-02-02 ENCOUNTER — HOSPITAL ENCOUNTER (EMERGENCY)
Facility: HOSPITAL | Age: 47
Discharge: LEFT BEFORE DISCHARGE | End: 2017-02-02

## 2017-02-02 VITALS
SYSTOLIC BLOOD PRESSURE: 128 MMHG | OXYGEN SATURATION: 100 % | TEMPERATURE: 98.1 F | HEIGHT: 73 IN | WEIGHT: 254 LBS | DIASTOLIC BLOOD PRESSURE: 84 MMHG | BODY MASS INDEX: 33.66 KG/M2 | HEART RATE: 85 BPM | RESPIRATION RATE: 18 BRPM

## 2017-02-02 PROCEDURE — 99211 OFF/OP EST MAY X REQ PHY/QHP: CPT

## 2017-02-02 RX ORDER — POLYETHYLENE GLYCOL 3350 17 G/17G
17 POWDER, FOR SOLUTION ORAL DAILY
COMMUNITY
End: 2019-02-06

## 2017-02-02 RX ORDER — AMOXICILLIN AND CLAVULANATE POTASSIUM 875; 125 MG/1; MG/1
1 TABLET, FILM COATED ORAL 2 TIMES DAILY
COMMUNITY
End: 2017-02-22

## 2017-02-02 RX ORDER — PANTOPRAZOLE SODIUM 40 MG/1
40 TABLET, DELAYED RELEASE ORAL DAILY
COMMUNITY
End: 2017-04-07

## 2017-02-02 RX ORDER — BROMPHENIRAMINE MALEATE, PSEUDOEPHEDRINE HYDROCHLORIDE, AND DEXTROMETHORPHAN HYDROBROMIDE 2; 30; 10 MG/5ML; MG/5ML; MG/5ML
SYRUP ORAL 4 TIMES DAILY PRN
COMMUNITY
End: 2019-02-06

## 2017-02-02 RX ORDER — LORAZEPAM 0.5 MG/1
0.5 TABLET ORAL 2 TIMES DAILY PRN
COMMUNITY
End: 2019-02-06

## 2017-02-02 RX ORDER — FLUTICASONE PROPIONATE 50 MCG
2 SPRAY, SUSPENSION (ML) NASAL DAILY
COMMUNITY
End: 2019-02-06

## 2017-02-03 ENCOUNTER — HOSPITAL ENCOUNTER (EMERGENCY)
Facility: HOSPITAL | Age: 47
Discharge: HOME | End: 2017-02-03
Attending: EMERGENCY MEDICINE | Admitting: EMERGENCY MEDICINE

## 2017-02-03 ENCOUNTER — APPOINTMENT (OUTPATIENT)
Dept: GENERAL RADIOLOGY | Facility: HOSPITAL | Age: 47
End: 2017-02-03

## 2017-02-03 ENCOUNTER — PREP FOR SURGERY (OUTPATIENT)
Dept: SURGERY | Facility: CLINIC | Age: 47
End: 2017-02-03

## 2017-02-03 ENCOUNTER — TELEPHONE (OUTPATIENT)
Dept: SURGERY | Facility: CLINIC | Age: 47
End: 2017-02-03

## 2017-02-03 VITALS
DIASTOLIC BLOOD PRESSURE: 94 MMHG | OXYGEN SATURATION: 99 % | TEMPERATURE: 97.6 F | WEIGHT: 254 LBS | HEART RATE: 82 BPM | RESPIRATION RATE: 16 BRPM | BODY MASS INDEX: 33.66 KG/M2 | HEIGHT: 73 IN | SYSTOLIC BLOOD PRESSURE: 141 MMHG

## 2017-02-03 DIAGNOSIS — T40.2X5A CONSTIPATION DUE TO OPIOID THERAPY: Primary | ICD-10-CM

## 2017-02-03 DIAGNOSIS — K59.03 CONSTIPATION DUE TO OPIOID THERAPY: Primary | ICD-10-CM

## 2017-02-03 PROCEDURE — 51798 US URINE CAPACITY MEASURE: CPT

## 2017-02-03 PROCEDURE — 74022 RADEX COMPL AQT ABD SERIES: CPT

## 2017-02-03 PROCEDURE — 99282 EMERGENCY DEPT VISIT SF MDM: CPT | Performed by: EMERGENCY MEDICINE

## 2017-02-03 PROCEDURE — 99282 EMERGENCY DEPT VISIT SF MDM: CPT

## 2017-02-03 RX ORDER — HYDROCODONE BITARTRATE AND ACETAMINOPHEN 5; 325 MG/1; MG/1
1 TABLET ORAL EVERY 6 HOURS PRN
COMMUNITY
End: 2017-02-22

## 2017-02-03 NOTE — ED NOTES
Pt had 0 ml residual on bladder scan after pt voided almost 2 hours ago.  Dr. Rodriguez notified.     Lorena Espinoza, RN  02/03/17 9633

## 2017-02-03 NOTE — ED PROVIDER NOTES
Subjective   Patient is a 46 y.o. male presenting with abdominal pain.   History provided by:  Relative and patient  Abdominal Pain   Pain location:  Epigastric  Pain quality: bloating and fullness    Pain radiates to:  Does not radiate  Pain severity:  Mild  Onset quality:  Gradual  Duration:  3 days  Timing:  Intermittent  Chronicity:  New  Context: laxative use and previous surgery    Context: not retching    Relieved by:  Nothing  Worsened by:  Nothing  Ineffective treatments:  None tried  Associated symptoms: constipation and flatus    Associated symptoms: no anorexia, no diarrhea, no fever, no nausea and no shortness of breath        Review of Systems   Constitutional: Negative for fever.   HENT: Negative.    Respiratory: Negative for shortness of breath.    Gastrointestinal: Positive for abdominal pain, constipation and flatus. Negative for anorexia, diarrhea and nausea.   Endocrine: Negative.    Genitourinary:        Hx of strictures.  Good urinary stream lately.   Neurological: Negative.        Past Medical History   Diagnosis Date   • Coronary artery disease    • GERD (gastroesophageal reflux disease)    • Hyperlipidemia    • Hypertension    • Irritable bowel    • Mental impairment    • Obesity    • Tachycardia      Dr Sanchez has seen 7/2016   • Umbilical hernia      sched repair       Allergies   Allergen Reactions   • Influenza Vaccines Other (See Comments)     Swelling at injection site       Past Surgical History   Procedure Laterality Date   • Cholecystectomy     • Urethral dilation     • Endoscopy N/A 12/14/2016     Procedure: ESOPHAGOGASTRODUODENOSCOPY w/ biopsies;  Surgeon: Laura Galloway MD;  Location: [facility];  Service:    • Colonoscopy N/A 12/14/2016     Procedure: COLONOSCOPY w/ polypectomy;  Surgeon: Laura Galloway MD;  Location: [facility] OR;  Service:    • Umbilical hernia repair N/A 1/20/2017     Procedure: UMBILICAL HERNIA REPAIR;  Surgeon: Ramo Medina MD;  Location: [facility] OR;   Service:        Family History   Problem Relation Age of Onset   • Depression Father    • Coronary artery disease Father    • Hypertension Father    • Heart disease Father    • Hypertension Sister    • Hypertension Brother        Social History     Social History   • Marital status: Single     Spouse name: N/A   • Number of children: N/A   • Years of education: N/A     Social History Main Topics   • Smoking status: Never Smoker   • Smokeless tobacco: Never Used   • Alcohol use No   • Drug use: No   • Sexual activity: Defer     Other Topics Concern   • None     Social History Narrative           Objective   Physical Exam   Constitutional: He is oriented to person, place, and time. He appears well-developed and well-nourished.   HENT:   Head: Normocephalic and atraumatic.   Eyes: EOM are normal. Pupils are equal, round, and reactive to light.   Cardiovascular: Normal rate and regular rhythm.    Pulmonary/Chest: Effort normal and breath sounds normal.   Abdominal: Soft. Bowel sounds are normal. He exhibits no distension and no mass. There is tenderness in the epigastric area. There is no rebound and no guarding. No hernia.       Very minimal ttp around umbilicus.  Wound looks good.   Neurological: He is alert and oriented to person, place, and time.   Nursing note and vitals reviewed.      Procedures         ED Course  ED Course                  MDM    Final diagnoses:   Constipation due to opioid therapy            Andre Rodriguez MD  02/03/17 5342       Andre Rodriguez MD  02/03/17 9855

## 2017-02-03 NOTE — ED NOTES
Pt's mother states that Dr. Medina will come over from surgery to see pt.       Lorena Espinoza, RN  02/03/17 8354

## 2017-02-03 NOTE — ED NOTES
Pt was triaged here yesterday but the wait was too long so he left.     Lorena Espinoza RN  02/03/17 1123

## 2017-02-03 NOTE — TELEPHONE ENCOUNTER
Pt was seen in ER yesterday for ABD pain and swelling. His mother (Arlet) called the answering service yesterday and had spoken with Dr. Cerna and she instructed the pt to call this morning to get an appointment to see you today. When would you like to see him? Arlet - [phone]

## 2017-02-22 ENCOUNTER — APPOINTMENT (OUTPATIENT)
Dept: LAB | Facility: HOSPITAL | Age: 47
End: 2017-02-22
Attending: SURGERY

## 2017-02-22 ENCOUNTER — OFFICE VISIT (OUTPATIENT)
Dept: SURGERY | Facility: CLINIC | Age: 47
End: 2017-02-22

## 2017-02-22 DIAGNOSIS — R35.0 INCREASED URINARY FREQUENCY: Primary | ICD-10-CM

## 2017-02-22 LAB
BILIRUB UR QL STRIP: NEGATIVE
CLARITY UR: CLEAR
COLOR UR: YELLOW
GLUCOSE UR STRIP-MCNC: NEGATIVE MG/DL
HGB UR QL STRIP.AUTO: NEGATIVE
KETONES UR QL STRIP: NEGATIVE
LEUKOCYTE ESTERASE UR QL STRIP.AUTO: NEGATIVE
NITRITE UR QL STRIP: NEGATIVE
PH UR STRIP.AUTO: 7 [PH] (ref 4.5–8)
PROT UR QL STRIP: NEGATIVE
SP GR UR STRIP: 1.02 (ref 1–1.03)
UROBILINOGEN UR QL STRIP: NORMAL

## 2017-02-22 PROCEDURE — 99024 POSTOP FOLLOW-UP VISIT: CPT | Performed by: SURGERY

## 2017-02-22 PROCEDURE — 81003 URINALYSIS AUTO W/O SCOPE: CPT | Performed by: SURGERY

## 2017-02-22 RX ORDER — HYDROCHLOROTHIAZIDE 12.5 MG/1
12.5 TABLET ORAL
COMMUNITY
Start: 2015-03-24 | End: 2019-02-06

## 2017-02-22 NOTE — PROGRESS NOTES
4 WKS 5 DAYS S/P UMB HERNIA, PT C/O PAIN/BURNING SENSATION AROUND UMB INCISION, C/O URINARY URGENCY SINCE SURGERY  Planes is some gas pains and some constipation since surgery.  He drinks about 3 glasses of water per day.  He has been taking MiraLAX.  Have advised him to increase his water intake to 8 glasses a day.  He complains of some burning around the incision.  The incision is healing well there is no evidence of recurrence or abscess.  There is no cellulitis.  He may liberalize his activities in 1 week.  He also complains of some urinary urgency.  We will check a UA and a urine C&S if indicated.  I'll see him back in 3 or 4 weeks.

## 2017-03-22 ENCOUNTER — OFFICE VISIT (OUTPATIENT)
Dept: SURGERY | Facility: CLINIC | Age: 47
End: 2017-03-22

## 2017-03-22 DIAGNOSIS — Z09 SURGICAL FOLLOW-UP CARE: Primary | ICD-10-CM

## 2017-03-22 PROCEDURE — 99024 POSTOP FOLLOW-UP VISIT: CPT | Performed by: SURGERY

## 2017-03-22 NOTE — PROGRESS NOTES
8 WKS 5 DAYS S/P UMB HERNIA, C/O PAIN AROUND INCISION BUT OTHERWISE WELL  He still complains of some periumbilical pain.  His incision is well-healed.  There is no impulse.  There is no induration or evidence of fluid collection.  I advised him to try some Advil for the pain he can resume his normal activities if he still having symptoms in 2 weeks she should call.  I will see him back when necessary.

## 2017-04-03 ENCOUNTER — TRANSCRIBE ORDERS (OUTPATIENT)
Dept: ADMINISTRATIVE | Facility: HOSPITAL | Age: 47
End: 2017-04-03

## 2017-04-03 DIAGNOSIS — R22.1 NECK MASS: Primary | ICD-10-CM

## 2017-04-05 ENCOUNTER — OFFICE VISIT (OUTPATIENT)
Dept: SURGERY | Facility: CLINIC | Age: 47
End: 2017-04-05

## 2017-04-05 VITALS — HEIGHT: 73 IN

## 2017-04-05 DIAGNOSIS — Z09 SURGICAL FOLLOW-UP CARE: Primary | ICD-10-CM

## 2017-04-05 PROCEDURE — 99024 POSTOP FOLLOW-UP VISIT: CPT | Performed by: SURGERY

## 2017-04-05 NOTE — PROGRESS NOTES
Says his pain is slowly improving.  He is not taking any medication for this.  There is no impulse.  I feel that this will continue to improve and he can take over-the-counter analgesics for this.  He can resume normal activities and I will see him back when necessary.

## 2017-04-06 ENCOUNTER — HOSPITAL ENCOUNTER (OUTPATIENT)
Dept: CT IMAGING | Facility: HOSPITAL | Age: 47
Discharge: HOME | End: 2017-04-06
Admitting: OTOLARYNGOLOGY

## 2017-04-06 DIAGNOSIS — R22.1 NECK MASS: ICD-10-CM

## 2017-04-06 PROCEDURE — 70491 CT SOFT TISSUE NECK W/DYE: CPT

## 2017-04-06 PROCEDURE — 0 IOPAMIDOL 61 % SOLUTION: Performed by: OTOLARYNGOLOGY

## 2017-04-06 RX ADMIN — IOPAMIDOL 100 ML: 612 INJECTION, SOLUTION INTRAVENOUS at 09:00

## 2017-05-04 ENCOUNTER — TRANSCRIBE ORDERS (OUTPATIENT)
Dept: ADMINISTRATIVE | Facility: HOSPITAL | Age: 47
End: 2017-05-04

## 2017-05-04 DIAGNOSIS — R07.89 OTHER CHEST PAIN: Primary | ICD-10-CM

## 2017-05-08 ENCOUNTER — HOSPITAL ENCOUNTER (OUTPATIENT)
Dept: CARDIOLOGY | Facility: HOSPITAL | Age: 47
Discharge: HOME | End: 2017-05-08
Admitting: FAMILY MEDICINE

## 2017-05-08 VITALS
DIASTOLIC BLOOD PRESSURE: 85 MMHG | BODY MASS INDEX: 36.36 KG/M2 | HEIGHT: 70 IN | SYSTOLIC BLOOD PRESSURE: 136 MMHG | HEART RATE: 79 BPM | OXYGEN SATURATION: 97 % | WEIGHT: 254 LBS

## 2017-05-08 DIAGNOSIS — R07.89 OTHER CHEST PAIN: ICD-10-CM

## 2017-05-08 LAB
BH CV ECHO MEAS - ACS: 1.8 CM
BH CV ECHO MEAS - AO MAX PG (FULL): 6 MMHG
BH CV ECHO MEAS - AO MAX PG: 6.5 MMHG
BH CV ECHO MEAS - AO MEAN PG (FULL): 1 MMHG
BH CV ECHO MEAS - AO MEAN PG: 3 MMHG
BH CV ECHO MEAS - AO ROOT AREA (BSA CORRECTED): 1.4
BH CV ECHO MEAS - AO ROOT AREA: 8.6 CM^2
BH CV ECHO MEAS - AO ROOT DIAM: 3.3 CM
BH CV ECHO MEAS - AO V2 MAX: 127 CM/SEC
BH CV ECHO MEAS - AO V2 MEAN: 81.5 CM/SEC
BH CV ECHO MEAS - AO V2 VTI: 25 CM
BH CV ECHO MEAS - AVA(I,A): 2.4 CM^2
BH CV ECHO MEAS - AVA(I,D): 2.4 CM^2
BH CV ECHO MEAS - AVA(V,A): 2.4 CM^2
BH CV ECHO MEAS - AVA(V,D): 2.4 CM^2
BH CV ECHO MEAS - BSA(HAYCOCK): 2.4 M^2
BH CV ECHO MEAS - BSA: 2.3 M^2
BH CV ECHO MEAS - BZI_BMI: 36.4 KILOGRAMS/M^2
BH CV ECHO MEAS - BZI_METRIC_HEIGHT: 177.8 CM
BH CV ECHO MEAS - BZI_METRIC_WEIGHT: 115.2 KG
BH CV ECHO MEAS - CONTRAST EF (2CH): 59.9 ML/M^2
BH CV ECHO MEAS - CONTRAST EF 4CH: 55.6 ML/M^2
BH CV ECHO MEAS - EDV(CUBED): 76.8 ML
BH CV ECHO MEAS - EDV(MOD-SP2): 94.1 ML
BH CV ECHO MEAS - EDV(MOD-SP4): 82.6 ML
BH CV ECHO MEAS - EDV(TEICH): 80.8 ML
BH CV ECHO MEAS - EF(CUBED): 64.1 %
BH CV ECHO MEAS - EF(MOD-SP2): 59.9 %
BH CV ECHO MEAS - EF(MOD-SP4): 55.6 %
BH CV ECHO MEAS - EF(TEICH): 56 %
BH CV ECHO MEAS - ESV(CUBED): 27.5 ML
BH CV ECHO MEAS - ESV(MOD-SP2): 37.7 ML
BH CV ECHO MEAS - ESV(MOD-SP4): 36.7 ML
BH CV ECHO MEAS - ESV(TEICH): 35.6 ML
BH CV ECHO MEAS - FS: 28.9 %
BH CV ECHO MEAS - IVS/LVPW: 1
BH CV ECHO MEAS - IVSD: 0.92 CM
BH CV ECHO MEAS - LAT PEAK E' VEL: 11 CM/SEC
BH CV ECHO MEAS - LV DIASTOLIC VOL/BSA (35-75): 35.7 ML/M^2
BH CV ECHO MEAS - LV MASS(C)D: 122.5 GRAMS
BH CV ECHO MEAS - LV MASS(C)DI: 53 GRAMS/M^2
BH CV ECHO MEAS - LV MAX PG: 3.9 MMHG
BH CV ECHO MEAS - LV MEAN PG: 2 MMHG
BH CV ECHO MEAS - LV SYSTOLIC VOL/BSA (12-30): 15.9 ML/M^2
BH CV ECHO MEAS - LV V1 MAX: 99 CM/SEC
BH CV ECHO MEAS - LV V1 MEAN: 64.3 CM/SEC
BH CV ECHO MEAS - LV V1 VTI: 19.1 CM
BH CV ECHO MEAS - LVIDD: 4.3 CM
BH CV ECHO MEAS - LVIDS: 3 CM
BH CV ECHO MEAS - LVLD AP2: 8.9 CM
BH CV ECHO MEAS - LVLD AP4: 9 CM
BH CV ECHO MEAS - LVLS AP2: 7.2 CM
BH CV ECHO MEAS - LVLS AP4: 8 CM
BH CV ECHO MEAS - LVOT AREA (M): 3.1 CM^2
BH CV ECHO MEAS - LVOT AREA: 3.1 CM^2
BH CV ECHO MEAS - LVOT DIAM: 2 CM
BH CV ECHO MEAS - LVPWD: 0.9 CM
BH CV ECHO MEAS - MED PEAK E' VEL: 10 CM/SEC
BH CV ECHO MEAS - MV A DUR: 0.17 SEC
BH CV ECHO MEAS - MV A MAX VEL: 105 CM/SEC
BH CV ECHO MEAS - MV DEC SLOPE: 751 CM/SEC^2
BH CV ECHO MEAS - MV DEC TIME: 0.19 SEC
BH CV ECHO MEAS - MV E MAX VEL: 110 CM/SEC
BH CV ECHO MEAS - MV E/A: 1
BH CV ECHO MEAS - MV MAX PG: 5.8 MMHG
BH CV ECHO MEAS - MV MEAN PG: 2 MMHG
BH CV ECHO MEAS - MV P1/2T MAX VEL: 118 CM/SEC
BH CV ECHO MEAS - MV P1/2T: 46 MSEC
BH CV ECHO MEAS - MV V2 MAX: 120 CM/SEC
BH CV ECHO MEAS - MV V2 MEAN: 70.8 CM/SEC
BH CV ECHO MEAS - MV V2 VTI: 29.3 CM
BH CV ECHO MEAS - MVA P1/2T LCG: 1.9 CM^2
BH CV ECHO MEAS - MVA(P1/2T): 4.8 CM^2
BH CV ECHO MEAS - MVA(VTI): 2 CM^2
BH CV ECHO MEAS - PA ACC TIME: 0.1 SEC
BH CV ECHO MEAS - PA MAX PG (FULL): 5.3 MMHG
BH CV ECHO MEAS - PA MAX PG: 6.8 MMHG
BH CV ECHO MEAS - PA PR(ACCEL): 36.3 MMHG
BH CV ECHO MEAS - PA V2 MAX: 130 CM/SEC
BH CV ECHO MEAS - PULM A REVS DUR: 0.14 SEC
BH CV ECHO MEAS - PULM A REVS VEL: 34.4 CM/SEC
BH CV ECHO MEAS - PULM DIAS VEL: 45.3 CM/SEC
BH CV ECHO MEAS - PULM S/D: 0.95
BH CV ECHO MEAS - PULM SYS VEL: 43.1 CM/SEC
BH CV ECHO MEAS - PVA(V,A): 1.8 CM^2
BH CV ECHO MEAS - PVA(V,D): 1.8 CM^2
BH CV ECHO MEAS - QP/QS: 0.72
BH CV ECHO MEAS - RV MAX PG: 1.5 MMHG
BH CV ECHO MEAS - RV MEAN PG: 1 MMHG
BH CV ECHO MEAS - RV V1 MAX: 60.6 CM/SEC
BH CV ECHO MEAS - RV V1 MEAN: 37.4 CM/SEC
BH CV ECHO MEAS - RV V1 VTI: 11.4 CM
BH CV ECHO MEAS - RVOT AREA: 3.8 CM^2
BH CV ECHO MEAS - RVOT DIAM: 2.2 CM
BH CV ECHO MEAS - SI(AO): 92.5 ML/M^2
BH CV ECHO MEAS - SI(CUBED): 21.3 ML/M^2
BH CV ECHO MEAS - SI(LVOT): 26 ML/M^2
BH CV ECHO MEAS - SI(MOD-SP2): 24.4 ML/M^2
BH CV ECHO MEAS - SI(MOD-SP4): 19.9 ML/M^2
BH CV ECHO MEAS - SI(TEICH): 19.6 ML/M^2
BH CV ECHO MEAS - SV(AO): 213.8 ML
BH CV ECHO MEAS - SV(CUBED): 49.2 ML
BH CV ECHO MEAS - SV(LVOT): 60 ML
BH CV ECHO MEAS - SV(MOD-SP2): 56.4 ML
BH CV ECHO MEAS - SV(MOD-SP4): 45.9 ML
BH CV ECHO MEAS - SV(RVOT): 43.3 ML
BH CV ECHO MEAS - SV(TEICH): 45.2 ML
BH CV ECHO MEAS - TAPSE (>1.6): 1.7 CM2
BH CV XLRA - RV BASE: 3.4 CM
BH CV XLRA - TDI S': 11 CM/SEC
E/E' RATIO: 11
LEFT ATRIUM VOLUME INDEX: 20 ML/M2
LEFT ATRIUM VOLUME: 41 CM3
LV EF 2D ECHO EST: 56 %

## 2017-05-08 PROCEDURE — 93306 TTE W/DOPPLER COMPLETE: CPT

## 2017-05-08 PROCEDURE — 0399T ADULT TRANSTHORACIC ECHO COMPLETE: CPT | Performed by: INTERNAL MEDICINE

## 2017-05-08 PROCEDURE — 0399T HC MYOCARDL STRAIN IMAG QUAN ASSMT PER SESS: CPT

## 2017-05-08 PROCEDURE — 93306 TTE W/DOPPLER COMPLETE: CPT | Performed by: INTERNAL MEDICINE

## 2017-05-25 ENCOUNTER — OFFICE VISIT (OUTPATIENT)
Dept: GASTROENTEROLOGY | Facility: CLINIC | Age: 47
End: 2017-05-25

## 2017-05-25 VITALS — HEART RATE: 72 BPM | OXYGEN SATURATION: 98 % | HEIGHT: 70 IN | BODY MASS INDEX: 37.11 KG/M2 | WEIGHT: 259.2 LBS

## 2017-05-25 DIAGNOSIS — M54.2 NECK PAIN: Primary | ICD-10-CM

## 2017-05-25 DIAGNOSIS — K21.9 GASTROESOPHAGEAL REFLUX DISEASE, ESOPHAGITIS PRESENCE NOT SPECIFIED: ICD-10-CM

## 2017-05-25 DIAGNOSIS — J02.9 SORE THROAT: ICD-10-CM

## 2017-05-25 PROCEDURE — 99213 OFFICE O/P EST LOW 20 MIN: CPT | Performed by: INTERNAL MEDICINE

## 2017-08-15 ENCOUNTER — HOSPITAL ENCOUNTER (OUTPATIENT)
Dept: PHYSICAL THERAPY | Facility: HOSPITAL | Age: 47
Setting detail: THERAPIES SERIES
Discharge: HOME | End: 2017-08-15

## 2017-08-15 DIAGNOSIS — R07.0 THROAT PAIN: ICD-10-CM

## 2017-08-15 DIAGNOSIS — M54.2 NECK PAIN: Primary | ICD-10-CM

## 2017-08-15 PROCEDURE — G8981 BODY POS CURRENT STATUS: HCPCS

## 2017-08-15 PROCEDURE — G8983 BODY POS D/C STATUS: HCPCS

## 2017-08-15 PROCEDURE — G8982 BODY POS GOAL STATUS: HCPCS

## 2017-08-15 PROCEDURE — 97161 PT EVAL LOW COMPLEX 20 MIN: CPT

## 2017-08-15 NOTE — THERAPY EVALUATION
"    Outpatient Physical Therapy Ortho Initial Evaluation   [place]     Patient Name: Robles Rodrigues  : 1970  MRN: [id number]  Today's Date: 8/15/2017      Visit Date: 08/15/2017    Patient Active Problem List   Diagnosis   • Fast heart beat   • Tachycardia   • Edema   • Benign essential hypertension   • Chronic coronary artery disease   • Hyperlipidemia        Past Medical History:   Diagnosis Date   • Coronary artery disease    • GERD (gastroesophageal reflux disease)    • Hyperlipidemia    • Hypertension    • Irritable bowel    • Mental impairment    • Migraines    • Obesity    • Tachycardia     Dr Sanchez has seen 2016   • Umbilical hernia     sched repair        Past Surgical History:   Procedure Laterality Date   • CHOLECYSTECTOMY     • COLONOSCOPY N/A 2016    Procedure: COLONOSCOPY w/ polypectomy;  Surgeon: Laura Galloway MD;  Location: [facility] OR;  Service:    • ENDOSCOPY N/A 2016    Procedure: ESOPHAGOGASTRODUODENOSCOPY w/ biopsies;  Surgeon: Laura Galloway MD;  Location: [facility] OR;  Service:    • UMBILICAL HERNIA REPAIR N/A 2017    Procedure: UMBILICAL HERNIA REPAIR;  Surgeon: Ramo Medina MD;  Location: [facility] OR;  Service:    • URETHRAL DILATION         Visit Dx:     ICD-10-CM ICD-9-CM   1. Neck pain M54.2 723.1   2. Throat pain R07.0 784.1             Patient History       08/15/17 0900          History    Chief Complaint Pain  -LN      Type of Pain Neck pain;Other pain   right throat  -LN      Date Current Problem(s) Began --   2017- 1-2 weeks after hernia surgery  -LN      Brief Description of Current Complaint Patient reports pain in right anterior neck/throat since 2017; 1-2 weeks after hernia surgery.  Patient & Mom reports no trauma to neck. Mom reports \"I don't know if it has anything to do with his hernia surgery or not.\" \"The doctor did give him some medicine for his stomach in case it was acid being pushed up in his throat and making his " "throat hurt.\"  Has been using warm compresses and sucking on sour candy without much relief.  He has seen multiple ENTs and has had CT scan, US, x-ray of neck and all have been normal per patient and Mom. She reports that they have not checked his carotid arteries. Mom reports he has gotten choked up a few times recently when eating. He does report pain with swallowing.  Mom reports that his doctor thinks that it is swollen gland. Patient reports that he did have a lot of swelling in neck and into right cheek, but that is now gone.   -LN      Previous treatment for THIS PROBLEM Medication  -LN      Onset Date- PT 08/15/2017  -LN      Patient/Caregiver Goals Relieve pain  -LN      Occupation/sports/leisure activities likes to bowl, play games on play station  -LN      Patient seeing anyone else for problem(s)? Yes  -LN      How has patient tried to help current problem? warm compresses; tylenol    PCP; ENT  -LN      What clinical tests have you had for this problem? CT scan  -LN      Results of Clinical Tests normal  -LN      Related/Recent Hospitalizations No  -LN      Surgery Date 1 --   s/p outpatient hernia surgery Jan 2017  -LN      History of Previous Related Injuries none reported  -LN      Pain     Pain Location Neck  -LN      Pain at Present 6  -LN      Pain at Best 6  -LN      Pain at Worst 10  -LN      Pain Frequency Constant/continuous  -LN      Pain Description Stabbing;Sharp   \"needle\"  -LN      What Performance Factors Make the Current Problem(s) WORSE? swallowing; lying down; pressing on right throat  -LN      What Performance Factors Make the Current Problem(s) BETTER? nothing reported  -LN      Tolerance Time- Lying limited by pain   sharp pain wakes him up at night  -LN      Is your sleep disturbed? Yes  -LN      Is medication used to assist with sleep? No  -LN      What position do you sleep in? Supine  -LN      Difficulties at work? doesn't work- disabled -LN      Difficulties with ADL's? none " "reported  -LN      Difficulties with recreational activities? no issues reported  -LN      Fall Risk Assessment    Any falls in the past year: No  -LN      Services    Prior Rehab/Home Health Experiences No  -LN      Daily Activities    Primary Language English  -LN      How does patient learn best? Listening;Demonstration;Pictures/Video  -LN      Teaching needs identified Other (comment)   Risks and benefits of treatment explained to patient  -LN      Patient is concerned about/has problems with Other (comment)   swallowing; sleeping  -LN      Does patient have problems with the following? Depression;Anxiety  -LN      Barriers to learning None  -LN      Recommended Referrals Speech Therapy   for possible swallow study  -LN      Pt Participated in POC and Goals Yes   with Mom  -LN      Safety    Are you being hurt, hit, or frightened by anyone at home or in your life? No  -LN      Are you being neglected by a caregiver No  -LN        User Key  (r) = Recorded By, (t) = Taken By, (c) = Cosigned By    Initials Name Provider Type    LN Shonna Love, PT Physical Therapist                PT Ortho       08/15/17 0900    Subjective Comments    Subjective Comments \"It only hurts in my throat, not my neck.\" \"It feels like a needle being poked in my throat.\"   -LN    Precautions and Contraindications    Precautions/Limitations no known precautions/limitations  -LN    Subjective Pain    Able to rate subjective pain? yes  -LN    Pre-Treatment Pain Level 6  -LN    Post-Treatment Pain Level 6  -LN    Subjective Pain Comment right anterior throat-in area or carotid artery.  -LN    Pain Assessment    Pain Assessment 0-10  -LN    Pain Score 6  -LN    Post Pain Score 6  -LN    Pain Type Chronic pain  -LN    Pain Location --   right throat  -LN    Pain Orientation Right  -LN    Pain Descriptors Stabbing;Sharp  -LN    Pain Frequency Constant/continuous  -LN    Result of Injury No  -LN    Work-Related Injury No  -LN    " Posture/Observations    Forward Head Mild  -LN    Rounded Shoulders Mild  -LN    Sensory Screen for Light Touch- Upper Quarter Clearing    C4 (posterior shoulder) Intact   no c/o N/T  -LN    C5 (lateral upper arm) Intact  -LN    C6 (tip of thumb) Intact  -LN    C7 (tip of 3rd finger) Intact  -LN    C8 (tip of 5th finger) Intact  -LN    T1 (medial lower arm) Intact  -LN    Cervical Palpation    Cervical Palpation- Location? --   No tenderness or muscle guarding noted  -LN    Cervical/Thoracic Special Tests    Cervical Compression (Forarminal Compression vs. Facet Pain) Bilateral:;Negative  -LN    Cervical Distraction (Foraminal Compression vs. Facet Pain) Bilateral:;Negative  -LN    Bakody/Shoulder Abduction Sign (Cervical Radiculopathy) Bilateral:;Negative  -LN    1st Rib Mobility (TOS) Bilateral:;Negative   no sign of elevated 1st rib  -LN    Neck    Flexion AROM Deficit WFL  -LN    Extension AROM Deficit WFL  -LN    Lt Lat Flexion AROM Deficit WFL  -LN    Rt Lateral Flexion AROM Deficit 75%   painfree  -LN    Lt Rotation AROM Deficit WFL  -LN    Rt Rotation AROM Deficit WFL  -LN    Upper Extremity    Upper Ext Manual Muscle Testing Detail Bilateral UE 5/5.   -LN    Upper Extremity Flexibility    Suboccipitals Bilateral:;WNL  -LN    Scalenes Bilateral:;WNL  -LN    SCM Bilateral:;WNL  -LN    Upper Trapezius Bilateral:;WNL  -LN    Levator Scapula Bilateral:;WNL  -LN    Pect Minor Bilateral:;WNL  -LN    Pect Major Bilateral:;WNL  -LN    UE Flexibility Comments No muscle guarding noted.  -LN      User Key  (r) = Recorded By, (t) = Taken By, (c) = Cosigned By    Initials Name Provider Type    LN Shonna Love, PT Physical Therapist                            Therapy Education       08/15/17 7049          Therapy Education    Education Details Patient to continue with warm compresses as tolerated and sour candy per MD.    -LN      Given Symptoms/condition management;Pain management  -LN      Provided to  Patient;Caregiver  -LN      Level of Understanding Teach back education performed;Verbalized  -LN        User Key  (r) = Recorded By, (t) = Taken By, (c) = Cosigned By    Initials Name Provider Type    SUE Love, PT Physical Therapist                    PT Assessment/Plan       08/15/17 1237       PT Assessment    Functional Limitations Other (comment)   pain with swallowing/recent episode of choking on food.  -LN     Impairments Pain;Range of motion  -LN     Assessment Comments Patient presents with history of pain anterior right neck/throat with tenderness (area of carotid artery) since January 2017 without injury/trauma.  Patient with increased pain with swallowing and recently had choking episode while eating.  Patient with good CROM, normal UE ROM & strength, no palpable muscle tightness or tenderness in neck musculature. No signs of muscular or joint problem.  No further PT is recommended at this time ( area of pain is not an area that PT can help with and unable to do any modalities in throat area besides MH & patient is already doing warm compresses at home without much benefit).   -LN     Please refer to paper survey for additional self-reported information Yes  -LN     Patient/caregiver participated in establishment of treatment plan and goals Yes  -LN     Patient would benefit from skilled therapy intervention No  -LN     PT Plan    PT Frequency One time visit  -LN     Predicted Duration of Therapy Intervention (days/wks) Evaluation only  -LN     Planned CPT's? PT EVAL LOW COMPLEXITY: 99898  -LN     PT Plan Comments Evaluation only. Recommended that Mom talk with patient 's PCP and see about getting an order to see a ST for a swallow study and also consider getting testing on cartotid artery. She is also considering taking him to [facility] to get looked at.   -LN       User Key  (r) = Recorded By, (t) = Taken By, (c) = Cosigned By    Initials Name Provider Type    SUE Gonsales  "JAZMIN Love Physical Therapist                  Exercises       08/15/17 0900          Subjective Comments    Subjective Comments \"It only hurts in my throat, not my neck.\" \"It feels like a needle being poked in my throat.\"   -LN      Subjective Pain    Able to rate subjective pain? yes  -LN      Pre-Treatment Pain Level 6  -LN      Post-Treatment Pain Level 6  -LN      Subjective Pain Comment right anterior throat-in area or carotid artery.  -LN        User Key  (r) = Recorded By, (t) = Taken By, (c) = Cosigned By    Initials Name Provider Type    SUE Love PT Physical Therapist                              Outcome Measures       08/15/17 1700          Neck Disability Index    Section 1 - Pain Intensity 3  -LN      Section 2 - Personal Care 3  -LN      Section 3 - Lifting 4  -LN      Section 4 - Work 2  -LN      Section 5 - Headaches 2  -LN      Section 6 - Concentration 2  -LN      Section 7 - Sleeping 1  -LN      Section 9 - Reading 3  -LN      Section 10 - Recreation 4  -LN      Neck Disability Index Score 24  -LN      Neck Disability Index Comments Patient doesn't drive  -LN      Functional Assessment    Outcome Measure Options Neck Disability Index (NDI)  -LN        User Key  (r) = Recorded By, (t) = Taken By, (c) = Cosigned By    Initials Name Provider Type    SUE Love, PT Physical Therapist        No goals set secondary to seen for evaluation only.     Time Calculation:   Start Time: 0930  Stop Time: 1000  Time Calculation (min): 30 min     Therapy Charges for Today     Code Description Service Date Service Provider Modifiers Qty    41311636541 HC PT CHNG MAIN POS CURRENT 8/15/2017 Shonna Love PT GP, CJ 1    90584079693 HC PT CHNG MAIN POS PROJECTED 8/15/2017 Shonna Love PT GP, CJ 1    [id number] HC PT CHNG MAIN POS DISCHARGE 8/15/2017 Shonna Love, PT GP, CJ 1    [id number] HC PT EVAL LOW COMPLEXITY 2 8/15/2017 Shonna Love PT " GP 1          PT G-Codes  PT Professional Judgement Used?: Yes  Outcome Measure Options: Neck Disability Index (NDI)  Score: 24  Functional Limitation: Changing and maintaining body position  Changing and Maintaining Body Position Current Status (): At least 20 percent but less than 40 percent impaired, limited or restricted  Changing and Maintaining Body Position Goal Status (): At least 20 percent but less than 40 percent impaired, limited or restricted  Changing and Maintaining Body Position Discharge Status (): At least 20 percent but less than 40 percent impaired, limited or restricted (Evaluation only)         Shonna Love, PT  8/15/2017

## 2018-01-29 ENCOUNTER — LAB (OUTPATIENT)
Dept: LAB | Facility: HOSPITAL | Age: 48
End: 2018-01-29
Attending: OTOLARYNGOLOGY

## 2018-01-29 ENCOUNTER — TRANSCRIBE ORDERS (OUTPATIENT)
Dept: ADMINISTRATIVE | Facility: HOSPITAL | Age: 48
End: 2018-01-29

## 2018-01-29 DIAGNOSIS — E03.9 HYPOTHYROIDISM, UNSPECIFIED TYPE: ICD-10-CM

## 2018-01-29 DIAGNOSIS — E03.9 HYPOTHYROIDISM, UNSPECIFIED TYPE: Primary | ICD-10-CM

## 2018-01-29 LAB
T4 SERPL-MCNC: 6.84 MCG/DL (ref 4.5–11.7)
TSH SERPL DL<=0.05 MIU/L-ACNC: 1.64 MIU/ML (ref 0.27–4.2)

## 2018-01-29 PROCEDURE — 36415 COLL VENOUS BLD VENIPUNCTURE: CPT

## 2018-01-29 PROCEDURE — 84436 ASSAY OF TOTAL THYROXINE: CPT

## 2018-01-29 PROCEDURE — 84443 ASSAY THYROID STIM HORMONE: CPT

## 2018-02-20 ENCOUNTER — LAB (OUTPATIENT)
Dept: LAB | Facility: HOSPITAL | Age: 48
End: 2018-02-20
Attending: UROLOGY

## 2018-02-20 ENCOUNTER — TRANSCRIBE ORDERS (OUTPATIENT)
Dept: ADMINISTRATIVE | Facility: HOSPITAL | Age: 48
End: 2018-02-20

## 2018-02-20 DIAGNOSIS — N40.1 ENLARGED PROSTATE WITH URINARY OBSTRUCTION: Primary | ICD-10-CM

## 2018-02-20 DIAGNOSIS — N13.8 ENLARGED PROSTATE WITH URINARY OBSTRUCTION: Primary | ICD-10-CM

## 2018-02-20 DIAGNOSIS — N40.1 ENLARGED PROSTATE WITH URINARY OBSTRUCTION: ICD-10-CM

## 2018-02-20 DIAGNOSIS — N13.8 ENLARGED PROSTATE WITH URINARY OBSTRUCTION: ICD-10-CM

## 2018-02-20 PROCEDURE — 84153 ASSAY OF PSA TOTAL: CPT

## 2018-02-20 PROCEDURE — 36415 COLL VENOUS BLD VENIPUNCTURE: CPT

## 2018-02-21 LAB
PSA SERPL-MCNC: 0.1 NG/ML (ref 0–4)
REFLEX: NORMAL

## 2018-04-27 ENCOUNTER — OFFICE VISIT (OUTPATIENT)
Dept: NEUROSURGERY | Facility: CLINIC | Age: 48
End: 2018-04-27

## 2018-04-27 VITALS
HEIGHT: 70 IN | BODY MASS INDEX: 36.56 KG/M2 | WEIGHT: 255.4 LBS | HEART RATE: 67 BPM | SYSTOLIC BLOOD PRESSURE: 125 MMHG | DIASTOLIC BLOOD PRESSURE: 86 MMHG

## 2018-04-27 DIAGNOSIS — M54.2 NECK PAIN ON RIGHT SIDE: Primary | ICD-10-CM

## 2018-04-27 PROCEDURE — 99203 OFFICE O/P NEW LOW 30 MIN: CPT | Performed by: PHYSICIAN ASSISTANT

## 2018-04-27 RX ORDER — OMEPRAZOLE 40 MG/1
CAPSULE, DELAYED RELEASE ORAL
COMMUNITY
Start: 2018-01-29 | End: 2019-02-06

## 2018-04-27 RX ORDER — IBUPROFEN 800 MG/1
TABLET ORAL
COMMUNITY
Start: 2018-02-14 | End: 2019-02-06

## 2018-04-27 NOTE — PROGRESS NOTES
Subjective   Patient ID: Robles Rodrigues is a 47 y.o. male is being seen for consultation today at the request of Dr. Lazarus Nino ENT for neck and right arm pain. He states he has had neck pain or one year.  He denies any recent cause or injury. Mr. Rodrigues tried Gabapentin 300 mg with no relief.     Neck Pain    The current episode started more than 1 year ago (March 2017). The problem occurs constantly. The problem has been unchanged. The pain is present in the anterior neck and right side. The quality of the pain is described as aching and stabbing. The pain is at a severity of 10/10. The pain is severe. The symptoms are aggravated by position. Associated symptoms include pain with swallowing. Pertinent negatives include no fever, headaches, leg pain, paresis, photophobia, syncope, trouble swallowing, visual change, weakness or weight loss. He has tried nothing for the symptoms. The treatment provided no relief.       The following portions of the patient's history were reviewed and updated as appropriate: allergies, current medications, past family history, past medical history, past social history, past surgical history and problem list.    Review of Systems   Constitutional: Positive for unexpected weight change (weight gain ). Negative for activity change, fever and weight loss.   HENT: Negative for trouble swallowing.    Eyes: Negative for photophobia.   Cardiovascular: Negative for syncope.   Genitourinary: Negative for difficulty urinating.   Musculoskeletal: Positive for neck pain.   Neurological: Negative for weakness and headaches.   All other systems reviewed and are negative.      Objective   Physical Exam   Constitutional: He is oriented to person, place, and time. He appears well-developed and well-nourished.   HENT:   Head: Normocephalic and atraumatic.   Right Ear: External ear normal.   Left Ear: External ear normal.   Eyes: Conjunctivae and EOM are normal. Pupils are equal, round, and  reactive to light. Right eye exhibits no discharge. Left eye exhibits no discharge.   Neck: Normal range of motion. Neck supple. No tracheal deviation present.   Cardiovascular: Normal rate.    Pulmonary/Chest: Effort normal. No stridor. No respiratory distress.   Musculoskeletal: Normal range of motion. He exhibits no edema, tenderness or deformity.   Neurological: He is alert and oriented to person, place, and time. He has normal strength and normal reflexes. He displays no atrophy, no tremor and normal reflexes. No cranial nerve deficit or sensory deficit. He exhibits normal muscle tone. He displays a negative Romberg sign. He displays no seizure activity. Coordination and gait normal.   No long tract signs   Skin: Skin is warm and dry.   Psychiatric: He has a normal mood and affect. His behavior is normal. Judgment and thought content normal.   Nursing note and vitals reviewed.    Neurologic Exam     Mental Status   Oriented to person, place, and time.     Cranial Nerves     CN III, IV, VI   Pupils are equal, round, and reactive to light.  Extraocular motions are normal.     Motor Exam     Strength   Strength 5/5 throughout.       Assessment/Plan   Independent Review of Radiographic Studies:    Did review a CT of the soft tissue that was unremarkable.  Medical Decision Making:    Mr. Rodrigues was referred to us by Dr. White.  The patient and his sister and mother state that his pain began just over a year ago without accident or injury.  The pain began a month or so after he did have a surgery for hiatal hernia.  He has been evaluated by 3 ENT.  The first ENT did offer to remove his tonsils in case that was the source of his pain but did not think that it was likely going to help.  Neither of the other 2 ENTs recommended that and he has had 2 EGDs, an ultrasound of his thyroid and neck, CT of the soft tissues all of which were unremarkable.  He has had multiple throat cultures including strep cultures which  were negative.  He was given him.  Treatment for potential candidiasis and also given increased medication for possible acid reflux without relief.  The pain is described as constant and stabbing and is localized in the right medial anterior neck.  It is a area about 1 inch in diameter that is tender to palpation but there is no palpable nodule or abnormality.  It is not associated with any posterior neck pain, facial pain, sensory change, arm pain.  He does report increased pain when swallowing and sometimes at night but no actual difficulty swallowing.  He has tried gabapentin without relief as well.  He is also referred to a psychiatrist since the pain began after his father passed away but psychiatric treatment has not helped either.    I told the patient and his family that I did not think his symptoms were cervical in nature.  However I did offer to check a cervical MRI to rule out the potential for any upper cervical spine radiculopathy.  They would like to move forward and will follow up after the MRI.  We did discuss a possible pain management referral if his medical workup continues to be unremarkable and the MRI does not reveal a source of his pain.  Robles was seen today for neck pain and arm pain.    Diagnoses and all orders for this visit:    Neck pain on right side  -     MRI Cervical Spine Without Contrast; Future      Return for follow up after radiology test.

## 2018-05-08 ENCOUNTER — HOSPITAL ENCOUNTER (OUTPATIENT)
Dept: MRI IMAGING | Facility: HOSPITAL | Age: 48
Discharge: HOME | End: 2018-05-08
Admitting: PHYSICIAN ASSISTANT

## 2018-05-08 DIAGNOSIS — M54.2 NECK PAIN ON RIGHT SIDE: ICD-10-CM

## 2018-05-08 PROCEDURE — 72141 MRI NECK SPINE W/O DYE: CPT

## 2018-05-15 ENCOUNTER — OFFICE VISIT (OUTPATIENT)
Dept: NEUROSURGERY | Facility: CLINIC | Age: 48
End: 2018-05-15

## 2018-05-15 VITALS
SYSTOLIC BLOOD PRESSURE: 138 MMHG | BODY MASS INDEX: 36.79 KG/M2 | WEIGHT: 257 LBS | HEART RATE: 83 BPM | HEIGHT: 70 IN | DIASTOLIC BLOOD PRESSURE: 84 MMHG

## 2018-05-15 DIAGNOSIS — M54.2 NECK PAIN ON RIGHT SIDE: Primary | ICD-10-CM

## 2018-05-15 DIAGNOSIS — M48.02 SPINAL STENOSIS IN CERVICAL REGION: ICD-10-CM

## 2018-05-15 PROCEDURE — 99213 OFFICE O/P EST LOW 20 MIN: CPT | Performed by: PHYSICIAN ASSISTANT

## 2019-02-06 ENCOUNTER — APPOINTMENT (OUTPATIENT)
Dept: CT IMAGING | Facility: HOSPITAL | Age: 49
End: 2019-02-06

## 2019-02-06 ENCOUNTER — HOSPITAL ENCOUNTER (EMERGENCY)
Facility: HOSPITAL | Age: 49
Discharge: HOME | End: 2019-02-06
Attending: EMERGENCY MEDICINE | Admitting: EMERGENCY MEDICINE

## 2019-02-06 VITALS
TEMPERATURE: 98.2 F | WEIGHT: 260 LBS | HEART RATE: 80 BPM | RESPIRATION RATE: 18 BRPM | HEIGHT: 73 IN | OXYGEN SATURATION: 100 % | SYSTOLIC BLOOD PRESSURE: 120 MMHG | BODY MASS INDEX: 34.46 KG/M2 | DIASTOLIC BLOOD PRESSURE: 79 MMHG

## 2019-02-06 DIAGNOSIS — R42 DIZZINESS: ICD-10-CM

## 2019-02-06 DIAGNOSIS — T14.8XXA SUBCUTANEOUS HEMATOMA: ICD-10-CM

## 2019-02-06 DIAGNOSIS — I95.1 ORTHOSTASIS: Primary | ICD-10-CM

## 2019-02-06 DIAGNOSIS — S09.90XA INJURY OF HEAD, INITIAL ENCOUNTER: ICD-10-CM

## 2019-02-06 LAB
ALBUMIN SERPL-MCNC: 4 G/DL (ref 3.5–5.2)
ALBUMIN/GLOB SERPL: 1.3 G/DL
ALP SERPL-CCNC: 103 U/L (ref 40–129)
ALT SERPL W P-5'-P-CCNC: 27 U/L (ref 5–41)
ANION GAP SERPL CALCULATED.3IONS-SCNC: 10 MMOL/L
APTT PPP: 24.6 SECONDS (ref 24.3–38.1)
AST SERPL-CCNC: 26 U/L (ref 5–40)
BASOPHILS # BLD AUTO: 0.02 10*3/MM3 (ref 0–0.2)
BASOPHILS NFR BLD AUTO: 0.2 % (ref 0–2)
BILIRUB SERPL-MCNC: 0.4 MG/DL (ref 0.2–1.2)
BUN BLD-MCNC: 11 MG/DL (ref 6–20)
BUN/CREAT SERPL: 12.1 (ref 7–25)
CALCIUM SPEC-SCNC: 9.1 MG/DL (ref 8.6–10.5)
CHLORIDE SERPL-SCNC: 100 MMOL/L (ref 98–107)
CO2 SERPL-SCNC: 28 MMOL/L (ref 22–29)
CREAT BLD-MCNC: 0.91 MG/DL (ref 0.76–1.27)
DEPRECATED RDW RBC AUTO: 40.2 FL (ref 37–54)
EOSINOPHIL # BLD AUTO: 0.18 10*3/MM3 (ref 0.1–0.3)
EOSINOPHIL NFR BLD AUTO: 2 % (ref 0–4)
ERYTHROCYTE [DISTWIDTH] IN BLOOD BY AUTOMATED COUNT: 13 % (ref 11.5–14.5)
GFR SERPL CREATININE-BSD FRML MDRD: 88 ML/MIN/1.73
GLOBULIN UR ELPH-MCNC: 3 GM/DL
GLUCOSE BLD-MCNC: 118 MG/DL (ref 65–99)
GLUCOSE BLDC GLUCOMTR-MCNC: 104 MG/DL (ref 70–130)
HCT VFR BLD AUTO: 43.1 % (ref 42–52)
HGB BLD-MCNC: 14.5 G/DL (ref 14–18)
IMM GRANULOCYTES # BLD AUTO: 0.03 10*3/MM3 (ref 0–0.03)
IMM GRANULOCYTES NFR BLD AUTO: 0.3 % (ref 0–0.5)
INR PPP: 1.09 (ref 0.9–1.1)
LYMPHOCYTES # BLD AUTO: 1.7 10*3/MM3 (ref 0.6–4.8)
LYMPHOCYTES NFR BLD AUTO: 18.6 % (ref 20–45)
MCH RBC QN AUTO: 28.5 PG (ref 27–31)
MCHC RBC AUTO-ENTMCNC: 33.6 G/DL (ref 31–37)
MCV RBC AUTO: 84.7 FL (ref 80–94)
MONOCYTES # BLD AUTO: 0.77 10*3/MM3 (ref 0–1)
MONOCYTES NFR BLD AUTO: 8.4 % (ref 3–8)
NEUTROPHILS # BLD AUTO: 6.44 10*3/MM3 (ref 1.5–8.3)
NEUTROPHILS NFR BLD AUTO: 70.5 % (ref 45–70)
NRBC BLD AUTO-RTO: 0 /100 WBC (ref 0–0)
PLATELET # BLD AUTO: 181 10*3/MM3 (ref 140–500)
PMV BLD AUTO: 10.5 FL (ref 7.4–10.4)
POTASSIUM BLD-SCNC: 4.4 MMOL/L (ref 3.5–5.2)
PROT SERPL-MCNC: 7 G/DL (ref 6–8.5)
PROTHROMBIN TIME: 13.8 SECONDS (ref 12.1–15)
RBC # BLD AUTO: 5.09 10*6/MM3 (ref 4.7–6.1)
SODIUM BLD-SCNC: 138 MMOL/L (ref 136–145)
TROPONIN T SERPL-MCNC: <0.01 NG/ML (ref 0–0.03)
WBC NRBC COR # BLD: 9.14 10*3/MM3 (ref 4.8–10.8)

## 2019-02-06 PROCEDURE — 99284 EMERGENCY DEPT VISIT MOD MDM: CPT

## 2019-02-06 PROCEDURE — 85610 PROTHROMBIN TIME: CPT | Performed by: PHYSICIAN ASSISTANT

## 2019-02-06 PROCEDURE — 74177 CT ABD & PELVIS W/CONTRAST: CPT

## 2019-02-06 PROCEDURE — 84484 ASSAY OF TROPONIN QUANT: CPT | Performed by: PHYSICIAN ASSISTANT

## 2019-02-06 PROCEDURE — 93005 ELECTROCARDIOGRAM TRACING: CPT | Performed by: PHYSICIAN ASSISTANT

## 2019-02-06 PROCEDURE — 0 IOPAMIDOL PER 1 ML: Performed by: EMERGENCY MEDICINE

## 2019-02-06 PROCEDURE — 96360 HYDRATION IV INFUSION INIT: CPT

## 2019-02-06 PROCEDURE — 99284 EMERGENCY DEPT VISIT MOD MDM: CPT | Performed by: PHYSICIAN ASSISTANT

## 2019-02-06 PROCEDURE — 70450 CT HEAD/BRAIN W/O DYE: CPT

## 2019-02-06 PROCEDURE — 93010 ELECTROCARDIOGRAM REPORT: CPT | Performed by: INTERNAL MEDICINE

## 2019-02-06 PROCEDURE — 80053 COMPREHEN METABOLIC PANEL: CPT | Performed by: PHYSICIAN ASSISTANT

## 2019-02-06 PROCEDURE — 72131 CT LUMBAR SPINE W/O DYE: CPT

## 2019-02-06 PROCEDURE — 85025 COMPLETE CBC W/AUTO DIFF WBC: CPT | Performed by: PHYSICIAN ASSISTANT

## 2019-02-06 PROCEDURE — 85730 THROMBOPLASTIN TIME PARTIAL: CPT | Performed by: PHYSICIAN ASSISTANT

## 2019-02-06 PROCEDURE — 82962 GLUCOSE BLOOD TEST: CPT

## 2019-02-06 RX ORDER — HYDROCODONE BITARTRATE AND ACETAMINOPHEN 7.5; 325 MG/1; MG/1
1 TABLET ORAL EVERY 6 HOURS PRN
COMMUNITY

## 2019-02-06 RX ORDER — PANTOPRAZOLE SODIUM 40 MG/1
40 TABLET, DELAYED RELEASE ORAL 2 TIMES DAILY
COMMUNITY

## 2019-02-06 RX ORDER — DULOXETIN HYDROCHLORIDE 30 MG/1
30 CAPSULE, DELAYED RELEASE ORAL DAILY
COMMUNITY
End: 2022-06-03

## 2019-02-06 RX ORDER — ACETAMINOPHEN 325 MG/1
650 TABLET ORAL ONCE
Status: COMPLETED | OUTPATIENT
Start: 2019-02-06 | End: 2019-02-06

## 2019-02-06 RX ORDER — SODIUM CHLORIDE 0.9 % (FLUSH) 0.9 %
10 SYRINGE (ML) INJECTION AS NEEDED
Status: DISCONTINUED | OUTPATIENT
Start: 2019-02-06 | End: 2019-02-06 | Stop reason: HOSPADM

## 2019-02-06 RX ORDER — LOSARTAN POTASSIUM 25 MG/1
25 TABLET ORAL 2 TIMES DAILY
COMMUNITY
End: 2022-06-03

## 2019-02-06 RX ADMIN — IOPAMIDOL 1 ML: 755 INJECTION, SOLUTION INTRAVENOUS at 17:54

## 2019-02-06 RX ADMIN — IOPAMIDOL 100 ML: 755 INJECTION, SOLUTION INTRAVENOUS at 17:57

## 2019-02-06 RX ADMIN — SODIUM CHLORIDE 1000 ML: 9 INJECTION, SOLUTION INTRAVENOUS at 16:14

## 2019-02-06 RX ADMIN — ACETAMINOPHEN 650 MG: 325 TABLET, FILM COATED ORAL at 17:52

## 2019-05-22 ENCOUNTER — APPOINTMENT (OUTPATIENT)
Dept: CT IMAGING | Facility: HOSPITAL | Age: 49
End: 2019-05-22

## 2019-05-22 ENCOUNTER — HOSPITAL ENCOUNTER (EMERGENCY)
Facility: HOSPITAL | Age: 49
Discharge: LEFT BEFORE DISCHARGE | End: 2019-05-22
Attending: EMERGENCY MEDICINE | Admitting: EMERGENCY MEDICINE

## 2019-05-22 VITALS
OXYGEN SATURATION: 95 % | DIASTOLIC BLOOD PRESSURE: 80 MMHG | BODY MASS INDEX: 35.36 KG/M2 | TEMPERATURE: 98.1 F | WEIGHT: 266.8 LBS | HEIGHT: 73 IN | SYSTOLIC BLOOD PRESSURE: 125 MMHG | HEART RATE: 81 BPM | RESPIRATION RATE: 16 BRPM

## 2019-05-22 DIAGNOSIS — M54.6 ACUTE LEFT-SIDED THORACIC BACK PAIN: Primary | ICD-10-CM

## 2019-05-22 PROCEDURE — 99283 EMERGENCY DEPT VISIT LOW MDM: CPT

## 2019-06-10 ENCOUNTER — HOSPITAL ENCOUNTER (OUTPATIENT)
Dept: PHYSICAL THERAPY | Facility: HOSPITAL | Age: 49
Setting detail: THERAPIES SERIES
Discharge: HOME | End: 2019-06-10

## 2019-06-10 DIAGNOSIS — M51.36 DISC DEGENERATION, LUMBAR: Primary | ICD-10-CM

## 2019-06-10 PROCEDURE — 97162 PT EVAL MOD COMPLEX 30 MIN: CPT | Performed by: PHYSICAL THERAPIST

## 2019-06-10 PROCEDURE — 97110 THERAPEUTIC EXERCISES: CPT | Performed by: PHYSICAL THERAPIST

## 2019-06-10 PROCEDURE — G0283 ELEC STIM OTHER THAN WOUND: HCPCS | Performed by: PHYSICAL THERAPIST

## 2019-06-10 NOTE — THERAPY EVALUATION
Outpatient Physical Therapy Ortho Initial Evaluation   Jocy York     Patient Name: Robles Rodrigues  : 1970  MRN: [id number]  Today's Date: 6/10/2019      Visit Date: 06/10/2019    Patient Active Problem List   Diagnosis   • Fast heart beat   • Tachycardia   • Edema   • Benign essential hypertension   • Chronic coronary artery disease   • Hyperlipidemia   • Neck pain on right side   • Spinal stenosis in cervical region        Past Medical History:   Diagnosis Date   • Anxiety    • Coronary artery disease    • GERD (gastroesophageal reflux disease)    • Hyperlipidemia    • Hypertension    • Irritable bowel    • Mental impairment    • Migraines    • Obesity    • Tachycardia     Dr Sanchez has seen 2016   • Umbilical hernia     sched repair        Past Surgical History:   Procedure Laterality Date   • CHOLECYSTECTOMY     • COLONOSCOPY N/A 2016    Procedure: COLONOSCOPY w/ polypectomy;  Surgeon: Laura Galloway MD;  Location: [facility] OR;  Service:    • ENDOSCOPY N/A 2016    Procedure: ESOPHAGOGASTRODUODENOSCOPY w/ biopsies;  Surgeon: Laura Galloway MD;  Location: [facility] OR;  Service:    • UMBILICAL HERNIA REPAIR N/A 2017    Procedure: UMBILICAL HERNIA REPAIR;  Surgeon: Ramo Medina MD;  Location: [facility] OR;  Service:    • URETHRAL DILATION         Visit Dx:     ICD-10-CM ICD-9-CM   1. Disc degeneration, lumbar M51.36 722.52         Patient History     Row Name 06/10/19 0900             History    Chief Complaint  Pain;Difficulty with daily activities  -SP      Type of Pain  Back pain  -SP      Date Current Problem(s) Began  19  -SP      Brief Description of Current Complaint  Patient and parent present for evaluation, patient is developmentally disabled.  Patient was having his medication adjusted and he had dizziness that resulted in a fall 19.   Patient fell, hitting his head and had back pain following with hematoma in lumbar area.  Patient went to ER and had testing.  He is scheduled to see a specialist this month.  Pain is worsening.  Parent reports that she was told that MRI involved nerve impingement.   Patient was prescribed hydrocodone and continues to take. Pain wakes him regularly at night  -SP      Previous treatment for THIS PROBLEM  Medication  -SP      Patient/Caregiver Goals  Relieve pain  -SP      Patient's Rating of General Health  Good  -SP      Occupation/sports/leisure activities  bowling, bocce ball with special [employer]  -SP      Patient seeing anyone else for problem(s)?  -- sceduled to see an spine specialist   -SP      How has patient tried to help current problem?  ice pain meds  -SP      What clinical tests have you had for this problem?  X-ray;CT scan;MRI  -SP      History of Previous Related Injuries  none reported  -SP         Pain     Pain Location  Back central lumbar  -SP      Pain at Present  9  -SP      Pain at Best  8  -SP      Pain at Worst  10  -SP      Pain Frequency  Constant/continuous  -SP      Pain Description  Shooting  -SP      What Performance Factors Make the Current Problem(s) WORSE?  lifting, sitting, walking  -SP      What Performance Factors Make the Current Problem(s) BETTER?  pain meds, ice, lying flat  -SP      Tolerance Time- Sitting  30 min  -SP      Tolerance Time- Walking  15- 20 min  -SP      Is your sleep disturbed?  Yes  -SP      What position do you sleep in?  Supine  -SP      Difficulties with ADL's?  needs assist dressing, bathing due to pain  -SP      Difficulties with recreational activities?  unable to participate in special [employer] activity  -SP         Fall Risk Assessment    Any falls in the past year:  Yes  -SP      Number of falls reported in the last 12 months  1  -SP      Factors that contributed to the fall:  -- medication related  -SP         Daily Activities    Primary Language  English  -SP      How does patient learn best?  Listening;Demonstration  -SP      Teaching needs identified  Home Exercise  Program;Management of Condition  -SP      Patient is concerned about/has problems with  Transfers (getting out of a chair, bed);Standing;Walking  -SP      Does patient have problems with the following?  Depression;Anxiety;Panic Attack  -SP      Barriers to learning  Cognitive  -SP      Pt Participated in POC and Goals  Yes  -SP         Safety    Are you being hurt, hit, or frightened by anyone at home or in your life?  No  -SP      Are you being neglected by a caregiver  No  -SP        User Key  (r) = Recorded By, (t) = Taken By, (c) = Cosigned By    Initials Name Provider Type    SP Xena Monroy, PT Physical Therapist          PT Ortho     Row Name 06/10/19 1400       Lower Extremity Flexibility    Hamstrings  --  -SP    Hip External Rotators  --  -SP    Row Name 06/10/19 0900       Posture/Observations    Forward Head  Mild  -SP    Lumbar lordosis  Normal  -SP    Observations  Edema;Ecchymosis/bruising  -SP    Posture/Observations Comments  Patient with central lumbar area of edema with mild ecchymosis in central to left side lumbar spine.    -SP       Neural Tension Signs- Lower Quarter Clearing    SLR  Bilateral:;Negative  -SP       Sensory Screen for Light Touch- Lower Quarter Clearing    L1 (inguinal area)  Bilateral:;Intact  -SP    L2 (anterior mid thigh)  Bilateral:;Intact  -SP    L3 (distal anterior thigh)  Bilateral:;Intact  -SP    L4 (medial lower leg/foot)  Bilateral:;Intact  -SP    L5 (lateral lower leg/great toe)  Bilateral:;Intact  -SP    S1 (bottom of foot)  Bilateral:;Intact  -SP       Myotomal Screen- Lower Quarter Clearing    Hip flexion (L2)  Right:;4 (Good);Left:;4- (Good -)  -SP    Knee extension (L3)  Bilateral:;4+ (Good +)  -SP    Ankle DF (L4)  Bilateral:;4 (Good)  -SP    Great toe extension (L5)  Bilateral:;4 (Good)  -SP    Ankle PF (S1)  Bilateral:;4 (Good)  -SP    Knee flexion (S2)  Bilateral:;4+ (Good +)  -SP       Lumbar/SI Special Tests    SLR (Neural Tension)   Bilateral:;Negative  -SP       Lumbosacral Palpation    SI  Bilateral:;Tender  -SP    Lumbosacral Segment  Bilateral:;Tender;Swollen  -SP    Erector Spinae (Paraspinals)  Bilateral:;Tender  -SP       Head/Neck/Trunk    Trunk Extension AROM  unable to extend: flexes knees in attempt to extend  -SP    Trunk Flexion AROM  decreased by 75% from full range with increased pain central lumbar spine  -SP    Trunk Lt Lateral Flexion AROM  decreased by 75% from full range with pain at end range  -SP    Trunk Rt Lateral Flexion AROM  decreased by 75% with complaints of pain at end range  -SP    Trunk Lt Rotation AROM  decreased by 25% from full range with complaints of pain at end range  -SP    Trunk Rt Rotation AROM  wfl with complaints of pain at end range  -SP       MMT Neck/Trunk    Trunk Flexion MMT, Gross Movement  (3/5) fair  -SP    Left Pelvic Elevation MMT, Gross Movement  (2-/5) poor minus  -SP    Right Pelvic Elevation MMT, Gross Movement:  (2-/5) poor minus  -SP    Neck/Trunk Comments   difficulty performing pelvic tilt  -SP       Sensation    Sensation WNL?  WFL  -SP       Lower Extremity Flexibility    Hamstrings  Bilateral:;Moderately limited  -SP    Hip External Rotators  Bilateral:;Moderately limited  -SP    Hip Internal Rotators  Bilateral:;Moderately limited  -SP       Pathomechanics    Spine Pathomechanics  Bends knees with attempted lumbar extension  -SP       Transfers    Sit-Stand [place] (Transfers)  independent  -SP    Stand-Sit [place] (Transfers)  independent  -SP    Comment (Transfers)  independent transfers S/S/S, uses UEs to assist with transfers to stand:  Patient instructed in using log roll technique to transfers sup/sit  -SP       Gait/Stairs Assessment/Training    [place] Level (Gait)  independent  -SP    Deviations/Abnormal Patterns (Gait)  gait speed decreased;stride length decreased  -SP    Bilateral Gait Deviations  forward flexed posture  -SP    Ascending Technique  (Stairs)  step-to-step  -SP    Descending Technique (Stairs)  step-to-step  -SP    Comment (Gait/Stairs)  requires hold to rail  -SP      User Key  (r) = Recorded By, (t) = Taken By, (c) = Cosigned By    Initials Name Provider Type    Xena Horton, PT Physical Therapist                      Therapy Education  Education Details: Patient instructed in appropriate technique with transfers sup/sit  Given: HEP, Posture/body mechanics  Program: New  How Provided: Verbal, Written  Provided to: Patient  Level of Understanding: Verbalized, Demonstrated     PT OP Goals     Row Name 06/10/19 0900          PT Short Term Goals    STG Date to Achieve  06/25/19  -SP     STG 1  Patient demonstrates appropriate transfers sup/sit without cues for technique  -SP     STG 2  Patient exhibit minimal tenderness to palpation lumbar paraspinals  -SP     STG 3  Patient reports decreased pain level to <3/10 at worst with ambulation >30 min.    -SP     STG 4  Patient is able to dress self and complete self care independently, unlimited by back pain.  -SP        Long Term Goals    LTG Date to Achieve  07/10/19  -SP     LTG 1  Patient reports decreased pain level to <2/10 with home and community mobility and ADLs  -SP     LTG 2  Patient demonstrates increased trunk strength by one muscle grade  -SP     LTG 3  Patient independent with HEP  -SP        Time Calculation    PT Goal Re-Cert Due Date  07/10/19  -SP       User Key  (r) = Recorded By, (t) = Taken By, (c) = Cosigned By    Initials Name Provider Type    Xena Horton, PT Physical Therapist          PT Assessment/Plan     Row Name 06/10/19 1542 06/10/19 1537       PT Assessment    Functional Limitations  --  Limitation in home management;Limitations in community activities;Limitations in functional capacity and performance;Performance in leisure activities;Performance in self-care ADL  -SP    Impairments  --  Muscle strength;Pain;Range of motion;Impaired  flexibility;Posture;Poor body mechanics  -SP    Assessment Comments  Patient with history of fall Feb 2019 resulting in injury to lumbar spine area with hematoma in the area.  He continues to have ongoing backpain and is scheudule to see spine specialist.  Patient presents with pain, decreased trunk mobility, decreased strength and impaired functional ability to complete home and community mobility and ADLs  -SP  --    Please refer to paper survey for additional self-reported information  Yes  -SP  --    Rehab Potential  Good  -SP  --    Patient/caregiver participated in establishment of treatment plan and goals  Yes  -SP  --    Patient would benefit from skilled therapy intervention  Yes  -SP  --       PT Plan    PT Frequency  2x/week  -SP  --    Predicted Duration of Therapy Intervention (Therapy Eval)  4 weeks  -SP  --    Planned CPT's?  PT EVAL MOD COMPLELITY: 69664;PT MANUAL THERAPY EA 15 MIN: 28897;PT HOT OR COLD PACK TREAT MCARE;PT ULTRASOUND EA 15 MIN: 58967;PT THER PROC EA 15 MIN: 61626;PT ELECTRICAL STIM UNATTEND:   -SP  --      User Key  (r) = Recorded By, (t) = Taken By, (c) = Cosigned By    Initials Name Provider Type    Xena Horton, PT Physical Therapist          Modalities     Row Name 06/10/19 0900             Ice    Ice Applied  Yes with IFC  -SP      Location  lumbar/sacral area  -SP      Rx Minutes  15 mins  -SP      Ice S/P Rx  Yes  -SP         ELECTRICAL STIMULATION    Attended/Unattended  Unattended  -SP      Stimulation Type  IFC  -SP      Location/Electrode Placement/Other  (B) L/S area  -SP        User Key  (r) = Recorded By, (t) = Taken By, (c) = Cosigned By    Initials Name Provider Type    Xena Horton, PT Physical Therapist        Exercises     Row Name 06/10/19 0900             Exercise 1    Exercise Name 1  SKTC  -SP      Reps 1  3  -SP      Time 1  20 sec  -SP         Exercise 2    Exercise Name 2  pirformis stretch  -SP      Reps 2  3  -SP       Time 2  20 sec  -SP         Exercise 3    Exercise Name 3  LTR  -SP      Reps 3  15  -SP        User Key  (r) = Recorded By, (t) = Taken By, (c) = Cosigned By    Initials Name Provider Type    SP Xena Monroy, PT Physical Therapist                        Outcome Measure Options: Other Outcome Measure  Other Outcome Measure Tool Used  Other Outcome Measure Tool Comments: back index score 80      Time Calculation:     Start Time: 0900  Stop Time: 1007  Time Calculation (min): 67 min     Therapy Charges for Today     Code Description Service Date Service Provider Modifiers Qty    25976016937 HC PT ELECTRICAL STIM UNATTENDED 6/10/2019 Xena Monroy, PT  1    56246929497 HC PT EVAL MOD COMPLEXITY 2 6/10/2019 Xena Monroy, PT GP 1    68599996734 HC PT THER PROC EA 15 MIN 6/10/2019 Xena Monroy, PT GP 1          PT G-Codes  Outcome Measure Options: Other Outcome Measure         Xena Monroy, PT  6/10/2019

## 2019-06-14 ENCOUNTER — HOSPITAL ENCOUNTER (OUTPATIENT)
Dept: PHYSICAL THERAPY | Facility: HOSPITAL | Age: 49
Setting detail: THERAPIES SERIES
Discharge: HOME | End: 2019-06-14

## 2019-06-14 DIAGNOSIS — M51.36 DISC DEGENERATION, LUMBAR: Primary | ICD-10-CM

## 2019-06-14 PROCEDURE — G0283 ELEC STIM OTHER THAN WOUND: HCPCS

## 2019-06-14 PROCEDURE — 97035 APP MDLTY 1+ULTRASOUND EA 15: CPT

## 2019-06-14 PROCEDURE — 97110 THERAPEUTIC EXERCISES: CPT

## 2019-06-14 NOTE — THERAPY TREATMENT NOTE
Outpatient Physical Therapy Ortho Treatment Note   Jocy York     Patient Name: Robles Rodrigues  : 1970  MRN: [id number]  Today's Date: 2019      Visit Date: 2019    Visit Dx:    ICD-10-CM ICD-9-CM   1. Disc degeneration, lumbar M51.36 722.52       Patient Active Problem List   Diagnosis   • Fast heart beat   • Tachycardia   • Edema   • Benign essential hypertension   • Chronic coronary artery disease   • Hyperlipidemia   • Neck pain on right side   • Spinal stenosis in cervical region        Past Medical History:   Diagnosis Date   • Anxiety    • Coronary artery disease    • GERD (gastroesophageal reflux disease)    • Hyperlipidemia    • Hypertension    • Irritable bowel    • Mental impairment    • Migraines    • Obesity    • Tachycardia     Dr Sanchez has seen 2016   • Umbilical hernia     sched repair        Past Surgical History:   Procedure Laterality Date   • CHOLECYSTECTOMY     • COLONOSCOPY N/A 2016    Procedure: COLONOSCOPY w/ polypectomy;  Surgeon: Laura Galloway MD;  Location: [facility] OR;  Service:    • ENDOSCOPY N/A 2016    Procedure: ESOPHAGOGASTRODUODENOSCOPY w/ biopsies;  Surgeon: Laura Galloway MD;  Location: [facility] OR;  Service:    • UMBILICAL HERNIA REPAIR N/A 2017    Procedure: UMBILICAL HERNIA REPAIR;  Surgeon: Ramo Medina MD;  Location: [facility] OR;  Service:    • URETHRAL DILATION         PT Ortho     Row Name 19 0800       Subjective Comments    Subjective Comments  Pt states his back is still sore; pt's mother reports that he is doing stretches at home but they are painful for him; mother also reports pt was seen by spine specialist and was told to continue with PT as planned - did not feel he is a surgery candidate at this time  -      User Key  (r) = Recorded By, (t) = Taken By, (c) = Cosigned By    Initials Name Provider Type    Saulo Ndiaye, PTA Physical Therapy Assistant                      PT Assessment/Plan     Row Name  06/14/19 0845          PT Assessment    Assessment Comments  Pt continues with area of edema lumbar spine; improved tolerance to stretches with addition of sheet for assist and cues to keep ex's in painfree range  -        PT Plan    Physical Therapy Interventions (Optional Details)  --  -     PT Plan Comments  Pt to continue HEP with changes made in clinic; pt's mother states she will call back for appts - her daughter typically manages pt's appts  -       User Key  (r) = Recorded By, (t) = Taken By, (c) = Cosigned By    Initials Name Provider Type     Saulo Milton PTA Physical Therapy Assistant          Modalities     Row Name 06/14/19 0800             Ice    Location  lumbar/sacral area w/ CP - pt supine with bolster under knees  -      Rx Minutes  15 mins  -      Ice S/P Rx  Yes  -         Ultrasound 48517    Location  L/S area - pt (L) sidelying with pillow between knees  -      Duty Cycle  100  -      Intensity - Wts/cm  1.5  -      88489 - PT Ultrasound Minutes  8  -         ELECTRICAL STIMULATION    Attended/Unattended  Unattended  -      Stimulation Type  IFC  -      Location/Electrode Placement/Other  (B) L/S area - supine with CP  -       PT E-Stim Unattended (Manual) Minutes  15  -MH        User Key  (r) = Recorded By, (t) = Taken By, (c) = Cosigned By    Initials Name Provider Type     Saulo Milton PTA Physical Therapy Assistant        Exercises     Row Name 06/14/19 0800             Subjective Comments    Subjective Comments  Pt states his back is still sore; pt's mother reports that he is doing stretches at home but they are painful for him; mother also reports pt was seen by spine specialist and was told to continue with PT as planned - did not feel he is a surgery candidate at this time  -         Exercise 1    Exercise Name 1  (B) SKTC  -      Cueing 1  Verbal;Tactile  -      Reps 1  3  -      Time 1  20 sec  -         Exercise 2    Exercise Name  2  (B) pirformis stretch  -      Cueing 2  Verbal;Tactile  -      Reps 2  3  -MH      Time 2  20 sec  -         Exercise 3    Exercise Name 3  (B) LTR  -MH      Cueing 3  Verbal;Tactile  -MH      Reps 3  10 each  -MH        User Key  (r) = Recorded By, (t) = Taken By, (c) = Cosigned By    Initials Name Provider Type     Saulo Milton PTA Physical Therapy Assistant                           Therapy Education  Education Details: Instruction to pt and mother to perform ex's within a painfree range - pt also performed SKC and piriformis stretch with sheet  Given: HEP, Symptoms/condition management  Program: Reinforced  How Provided: Verbal  Provided to: Patient  Level of Understanding: Teach back education performed, Verbalized, Demonstrated              Time Calculation:   Start Time: 0756  Stop Time: 0848  Time Calculation (min): 52 min  Therapy Charges for Today     Code Description Service Date Service Provider Modifiers Qty    19325012188 HC PT ELECTRICAL STIM UNATTENDED 6/14/2019 Saulo Milton PTA  1    41975310804 HC PT ULTRASOUND EA 15 MIN 6/14/2019 Saulo Milton PTA GP 1    40244092828 HC PT THER PROC EA 15 MIN 6/14/2019 Saulo Milton PTA GP 1                    Saulo Milton PTA  6/14/2019

## 2019-06-18 ENCOUNTER — HOSPITAL ENCOUNTER (OUTPATIENT)
Dept: PHYSICAL THERAPY | Facility: HOSPITAL | Age: 49
Setting detail: THERAPIES SERIES
Discharge: HOME | End: 2019-06-18

## 2019-06-18 DIAGNOSIS — M51.36 DISC DEGENERATION, LUMBAR: Primary | ICD-10-CM

## 2019-06-18 PROCEDURE — G0283 ELEC STIM OTHER THAN WOUND: HCPCS

## 2019-06-18 PROCEDURE — 97035 APP MDLTY 1+ULTRASOUND EA 15: CPT

## 2019-06-18 PROCEDURE — 97110 THERAPEUTIC EXERCISES: CPT

## 2019-06-18 NOTE — THERAPY TREATMENT NOTE
Outpatient Physical Therapy Ortho Treatment Note   Jocy York     Patient Name: Robles Rodrigues  : 1970  MRN: [id number]  Today's Date: 2019      Visit Date: 2019    Visit Dx:    ICD-10-CM ICD-9-CM   1. Disc degeneration, lumbar M51.36 722.52       Patient Active Problem List   Diagnosis   • Fast heart beat   • Tachycardia   • Edema   • Benign essential hypertension   • Chronic coronary artery disease   • Hyperlipidemia   • Neck pain on right side   • Spinal stenosis in cervical region        Past Medical History:   Diagnosis Date   • Anxiety    • Coronary artery disease    • GERD (gastroesophageal reflux disease)    • Hyperlipidemia    • Hypertension    • Irritable bowel    • Mental impairment    • Migraines    • Obesity    • Tachycardia     Dr Sanchez has seen 2016   • Umbilical hernia     sched repair        Past Surgical History:   Procedure Laterality Date   • CHOLECYSTECTOMY     • COLONOSCOPY N/A 2016    Procedure: COLONOSCOPY w/ polypectomy;  Surgeon: Laura Galloway MD;  Location: [facility];  Service:    • ENDOSCOPY N/A 2016    Procedure: ESOPHAGOGASTRODUODENOSCOPY w/ biopsies;  Surgeon: Laura Galloway MD;  Location: [facility] OR;  Service:    • UMBILICAL HERNIA REPAIR N/A 2017    Procedure: UMBILICAL HERNIA REPAIR;  Surgeon: Ramo Medina MD;  Location: [facility] OR;  Service:    • URETHRAL DILATION                         PT Assessment/Plan     Row Name 19 1609          PT Assessment    Assessment Comments  Edema continues to be present lumbar spine; tolerating ex's well but continues to complain of pain  -        PT Plan    PT Plan Comments  Cont per POC, progressing as tolerated  -       User Key  (r) = Recorded By, (t) = Taken By, (c) = Cosigned By    Initials Name Provider Type    Saulo Ndiaye, PTA Physical Therapy Assistant          Modalities     Row Name 19 0800             Subjective Comments    Subjective Comments  Pt and mother  reporting compliance of HEP; pt continues to report LBP and pain with ex's - says it may be a little better since adding sheet for assistance with stretches  -         Ice    Location  lumbar/sacral area w/ CP - pt supine with bolster under knees  -      Rx Minutes  15 mins  -      Ice S/P Rx  Yes  -         Ultrasound 95403    Location  L/S area - pt (L) sidelying with pillow between knees  -      Duty Cycle  100  -      Intensity - Wts/cm  1.5  -      14337 - PT Ultrasound Minutes  8  -MH         ELECTRICAL STIMULATION    Attended/Unattended  Unattended  -      Stimulation Type  IFC  -      Location/Electrode Placement/Other  (B) L/S area - supine with CP  -       PT E-Stim Unattended (Manual) Minutes  15  -MH        User Key  (r) = Recorded By, (t) = Taken By, (c) = Cosigned By    Initials Name Provider Type     Saulo Milton, JOAN Physical Therapy Assistant        Exercises     Row Name 06/18/19 0800             Subjective Comments    Subjective Comments  Pt and mother reporting compliance of HEP; pt continues to report LBP and pain with ex's - says it may be a little better since adding sheet for assistance with stretches  -         Exercise 1    Exercise Name 1  (B) SKTC  -      Cueing 1  Verbal;Tactile  -MH      Reps 1  3  -MH      Time 1  20 sec  -MH         Exercise 2    Exercise Name 2  (B) pirformis stretch  -      Cueing 2  Verbal;Tactile  -MH      Reps 2  3  -MH      Time 2  20 sec  -MH         Exercise 3    Exercise Name 3  (B) LTR  -MH      Cueing 3  Verbal;Tactile  -MH      Reps 3  10 each  -MH         Exercise 4    Exercise Name 4  Abdominal bracing  -      Cueing 4  Verbal;Tactile  -MH      Reps 4  5  -MH      Time 4  5 sec  -        User Key  (r) = Recorded By, (t) = Taken By, (c) = Cosigned By    Initials Name Provider Type     Saulo Milton, JOAN Physical Therapy Assistant                           Therapy Education  Education Details: Continued review of  HEP in pain free range and mechanics for supine to sit  Given: HEP, Posture/body mechanics  Program: Reinforced, New  How Provided: Verbal, Demonstration  Provided to: Patient, Caregiver  Level of Understanding: Teach back education performed, Verbalized, Demonstrated              Time Calculation:   Start Time: 0803  Stop Time: 0852  Time Calculation (min): 49 min  Therapy Charges for Today     Code Description Service Date Service Provider Modifiers Qty    69193528154 HC PT ELECTRICAL STIM UNATTENDED 6/18/2019 Saulo Milton, PTA  1    38898739791 HC PT THER PROC EA 15 MIN 6/18/2019 Saulo Milton, PTA GP 1    47183907464 HC PT ULTRASOUND EA 15 MIN 6/18/2019 Saulo Milton, JOAN GP 1                    Saulo Milton PTA  6/18/2019

## 2019-06-21 ENCOUNTER — HOSPITAL ENCOUNTER (OUTPATIENT)
Dept: PHYSICAL THERAPY | Facility: HOSPITAL | Age: 49
Setting detail: THERAPIES SERIES
Discharge: HOME | End: 2019-06-21

## 2019-06-21 DIAGNOSIS — M51.36 DISC DEGENERATION, LUMBAR: Primary | ICD-10-CM

## 2019-06-21 PROCEDURE — 97035 APP MDLTY 1+ULTRASOUND EA 15: CPT

## 2019-06-21 PROCEDURE — 97110 THERAPEUTIC EXERCISES: CPT

## 2019-06-21 PROCEDURE — G0283 ELEC STIM OTHER THAN WOUND: HCPCS

## 2019-06-21 NOTE — THERAPY TREATMENT NOTE
Outpatient Physical Therapy Ortho Treatment Note   [facility]     Patient Name: Robles Rodrigues  : 1970  MRN: [id number]  Today's Date: 2019      Visit Date: 2019    Visit Dx:    ICD-10-CM ICD-9-CM   1. Disc degeneration, lumbar M51.36 722.52       Patient Active Problem List   Diagnosis   • Fast heart beat   • Tachycardia   • Edema   • Benign essential hypertension   • Chronic coronary artery disease   • Hyperlipidemia   • Neck pain on right side   • Spinal stenosis in cervical region        Past Medical History:   Diagnosis Date   • Anxiety    • Coronary artery disease    • GERD (gastroesophageal reflux disease)    • Hyperlipidemia    • Hypertension    • Irritable bowel    • Mental impairment    • Migraines    • Obesity    • Tachycardia     Dr Sanchez has seen 2016   • Umbilical hernia     sched repair        Past Surgical History:   Procedure Laterality Date   • CHOLECYSTECTOMY     • COLONOSCOPY N/A 2016    Procedure: COLONOSCOPY w/ polypectomy;  Surgeon: Laura Galloway MD;  Location: [facility];  Service:    • ENDOSCOPY N/A 2016    Procedure: ESOPHAGOGASTRODUODENOSCOPY w/ biopsies;  Surgeon: Laura Galloway MD;  Location: [facility] OR;  Service:    • UMBILICAL HERNIA REPAIR N/A 2017    Procedure: UMBILICAL HERNIA REPAIR;  Surgeon: Ramo Medina MD;  Location: [facility] OR;  Service:    • URETHRAL DILATION         PT Ortho     Row Name 19 0800       Posture/Observations    Posture/Observations Comments  Edema continues to be present lumbar spine but presents less concentrated in one area and is more dispersed.  -      User Key  (r) = Recorded By, (t) = Taken By, (c) = Cosigned By    Initials Name Provider Type    Saulo Ndiaye PTA Physical Therapy Assistant                      PT Assessment/Plan     Row Name 19 1205          PT Assessment    Assessment Comments  Edema presents more dispersed today; continue to cue pt to keep stretches in pain free  range  -MH        PT Plan    PT Plan Comments  Cont per POC, progress as tolerated  -       User Key  (r) = Recorded By, (t) = Taken By, (c) = Cosigned By    Initials Name Provider Type     Saulo Milton JOAN Booker Physical Therapy Assistant          Modalities     Row Name 06/21/19 1100 06/21/19 0800          Subjective Comments    Subjective Comments  --  Pt states there is little change in his symptoms but continues with HEP - no questions/concerns  -        Ice    Location  --  lumbar/sacral area w/ CP - pt supine with bolster under knees  -MH     Rx Minutes  --  15 mins  -MH     Ice S/P Rx  --  Yes  -        Ultrasound 72647    Location  --  L/S area - pt (R) sidelying with pillow between knees  -     Duty Cycle  --  100  -MH     Intensity - Wts/cm  --  1.5  -MH     47576 - PT Ultrasound Minutes  --  8  -MH        ELECTRICAL STIMULATION    Attended/Unattended  --  Unattended  -     Stimulation Type  --  IFC  -     Location/Electrode Placement/Other  --  (B) L/S area - supine with CP  -      PT E-Stim Unattended (Manual) Minutes  --  15  -MH        Other Treatment Provided    Rx Minutes  15 mins  -MH  15 mins  -MH     Rx Minutes  8  -MH  8  -MH       User Key  (r) = Recorded By, (t) = Taken By, (c) = Cosigned By    Initials Name Provider Type     Saulo Milton JOAN Booker Physical Therapy Assistant        Exercises     Row Name 06/21/19 0800             Subjective Comments    Subjective Comments  Pt states there is little change in his symptoms but continues with HEP - no questions/concerns  -MH         Exercise 1    Exercise Name 1  (B) SKTC  -MH      Cueing 1  Verbal  -MH      Reps 1  3  -MH      Time 1  20 sec  -MH         Exercise 2    Exercise Name 2  (B) pirformis stretch  -MH      Cueing 2  Verbal  -MH      Reps 2  3  -MH      Time 2  20 sec  -MH         Exercise 3    Exercise Name 3  (B) LTR  -MH      Cueing 3  Verbal  -MH      Reps 3  10 each  -MH         Exercise 4    Exercise Name 4   Abdominal bracing  -      Cueing 4  Verbal  -      Reps 4  10  -MH      Time 4  5 sec  -         Exercise 5    Exercise Name 5  Hamstring stretch with sheet  -      Cueing 5  Verbal;Tactile  -      Reps 5  3  -MH      Time 5  10 sec  -        User Key  (r) = Recorded By, (t) = Taken By, (c) = Cosigned By    Initials Name Provider Type     Saulo Milton PTA Physical Therapy Assistant                           Therapy Education  Education Details: Continues with cues throughout ex's; pt to add hamstring stretches to HEP - written instruction issued and reviewed  Given: HEP  Program: New, Reinforced  How Provided: Verbal, Demonstration, Written  Provided to: Patient  Level of Understanding: Teach back education performed, Verbalized, Demonstrated              Time Calculation:   Start Time: 0800  Stop Time: 0851  Time Calculation (min): 51 min  Therapy Charges for Today     Code Description Service Date Service Provider Modifiers Qty    36074554529 HC PT ELECTRICAL STIM UNATTENDED 6/21/2019 Saulo Milton, PTA  1    70080670934 HC PT THER PROC EA 15 MIN 6/21/2019 Saulo Milton PTA GP 1    94434982725 HC PT ULTRASOUND EA 15 MIN 6/21/2019 Saulo Milton PTA GP 1                    Saulo Milton PTA  6/21/2019

## 2019-07-03 ENCOUNTER — HOSPITAL ENCOUNTER (OUTPATIENT)
Dept: PHYSICAL THERAPY | Facility: HOSPITAL | Age: 49
Setting detail: THERAPIES SERIES
Discharge: HOME | End: 2019-07-03

## 2019-07-03 DIAGNOSIS — M51.36 DISC DEGENERATION, LUMBAR: Primary | ICD-10-CM

## 2019-07-03 PROCEDURE — G0283 ELEC STIM OTHER THAN WOUND: HCPCS

## 2019-07-03 PROCEDURE — 97110 THERAPEUTIC EXERCISES: CPT

## 2019-07-03 NOTE — THERAPY TREATMENT NOTE
Outpatient Physical Therapy Ortho Treatment Note   [facility]     Patient Name: Robles Rdorigues  : 1970  MRN: [id number]  Today's Date: 7/3/2019      Visit Date: 2019    Visit Dx:    ICD-10-CM ICD-9-CM   1. Disc degeneration, lumbar M51.36 722.52       Patient Active Problem List   Diagnosis   • Fast heart beat   • Tachycardia   • Edema   • Benign essential hypertension   • Chronic coronary artery disease   • Hyperlipidemia   • Neck pain on right side   • Spinal stenosis in cervical region        Past Medical History:   Diagnosis Date   • Anxiety    • Coronary artery disease    • GERD (gastroesophageal reflux disease)    • Hyperlipidemia    • Hypertension    • Irritable bowel    • Mental impairment    • Migraines    • Obesity    • Tachycardia     Dr Sanchez has seen 2016   • Umbilical hernia     sched repair        Past Surgical History:   Procedure Laterality Date   • CHOLECYSTECTOMY     • COLONOSCOPY N/A 2016    Procedure: COLONOSCOPY w/ polypectomy;  Surgeon: Laura Galloway MD;  Location: [facility];  Service:    • ENDOSCOPY N/A 2016    Procedure: ESOPHAGOGASTRODUODENOSCOPY w/ biopsies;  Surgeon: Laura Galloway MD;  Location: [facility] OR;  Service:    • UMBILICAL HERNIA REPAIR N/A 2017    Procedure: UMBILICAL HERNIA REPAIR;  Surgeon: Ramo Medina MD;  Location: [facility];  Service:    • URETHRAL DILATION                         PT Assessment/Plan     Row Name 19 1608          PT Assessment    Assessment Comments  Pt with decreased pain reported and increased tolerance to taking out the garbage; pt tolerated progression of ex's well  -        PT Plan    PT Plan Comments  Pt to add new ex's to HEP; continue per POC  -       User Key  (r) = Recorded By, (t) = Taken By, (c) = Cosigned By    Initials Name Provider Type    Saulo Ndiaye PTA Physical Therapy Assistant          Modalities     Row Name 19 1100             Subjective Comments    Subjective  Comments  Pt reports his back is feeling better and that he is able to take out the garbage now with less pain; pt's mother also states pt has been complaining less of back pain  -         Ice    Location  lumbar/sacral area w/ CP - pt supine with bolster under knees  -      Rx Minutes  15 mins  -      Ice S/P Rx  Yes  -         Ultrasound 98606    Location  L/S area - pt (R) sidelying with pillow between knees  -      Duty Cycle  100  -      Intensity - Wts/cm  1.5  -      91161 - PT Ultrasound Minutes  8  -MH         ELECTRICAL STIMULATION    Attended/Unattended  Unattended  -      Stimulation Type  IFC  -      Location/Electrode Placement/Other  (B) L/S area - supine with CP  -       PT E-Stim Unattended (Manual) Minutes  15  -MH        User Key  (r) = Recorded By, (t) = Taken By, (c) = Cosigned By    Initials Name Provider Type     Saulo Milton PTA Physical Therapy Assistant        Exercises     Row Name 07/03/19 1100             Subjective Comments    Subjective Comments  Pt reports his back is feeling better and that he is able to take out the garbage now with less pain; pt's mother also states pt has been complaining less of back pain  -         Exercise 1    Exercise Name 1  (B) SKTC  -      Cueing 1  Verbal  -      Reps 1  3  -MH      Time 1  20 sec  -         Exercise 2    Exercise Name 2  (B) pirformis stretch  -      Cueing 2  Verbal  -MH      Reps 2  3  -MH      Time 2  20 sec  -MH         Exercise 3    Exercise Name 3  (B) LTR  -      Cueing 3  Verbal  -      Reps 3  10 each  -         Exercise 4    Exercise Name 4  Abdominal bracing  -      Cueing 4  Verbal  -MH      Reps 4  10  -MH      Time 4  5 sec  -MH         Exercise 5    Exercise Name 5  Hamstring stretch with sheet  -      Cueing 5  Verbal;Tactile  -MH      Reps 5  3  -MH      Time 5  10 sec  -         Exercise 6    Exercise Name 6  Abd. bracing with ball squeeze  -      Cueing 6   Verbal;Tactile  -MH      Reps 6  10  -MH      Time 6  5 sec  -MH         Exercise 7    Exercise Name 7  Abd. bracing w/ hip abd vs theraband  -MH      Cueing 7  Verbal;Tactile  -MH      Reps 7  10  -MH      Time 7  5 sec - blue  -MH        User Key  (r) = Recorded By, (t) = Taken By, (c) = Cosigned By    Initials Name Provider Type     Saulo Milton PTA Physical Therapy Assistant                           Therapy Education  Education Details: Written copy of new ex's issued and reviewed with pt and pt's mother; blue theraband issued for home  Given: HEP  Program: New, Reinforced  How Provided: Verbal, Written  Provided to: Patient  Level of Understanding: Teach back education performed, Verbalized, Demonstrated              Time Calculation:   Start Time: 1100  Stop Time: 1153  Time Calculation (min): 53 min  Therapy Charges for Today     Code Description Service Date Service Provider Modifiers Qty    72691845424 HC PT THER PROC EA 15 MIN 7/3/2019 Saulo Milton PTA GP 1    50636568651 HC PT ELECTRICAL STIM UNATTENDED 7/3/2019 Saulo Milton PTA  1                    Saulo Milton PTA  7/3/2019

## 2019-07-10 ENCOUNTER — APPOINTMENT (OUTPATIENT)
Dept: PHYSICAL THERAPY | Facility: HOSPITAL | Age: 49
End: 2019-07-10

## 2019-07-12 ENCOUNTER — APPOINTMENT (OUTPATIENT)
Dept: PHYSICAL THERAPY | Facility: HOSPITAL | Age: 49
End: 2019-07-12

## 2019-07-16 ENCOUNTER — HOSPITAL ENCOUNTER (OUTPATIENT)
Dept: PHYSICAL THERAPY | Facility: HOSPITAL | Age: 49
Setting detail: THERAPIES SERIES
Discharge: HOME | End: 2019-07-16

## 2019-07-16 DIAGNOSIS — M51.36 DISC DEGENERATION, LUMBAR: Primary | ICD-10-CM

## 2019-07-16 PROCEDURE — 97035 APP MDLTY 1+ULTRASOUND EA 15: CPT | Performed by: PHYSICAL THERAPIST

## 2019-07-16 PROCEDURE — G0283 ELEC STIM OTHER THAN WOUND: HCPCS | Performed by: PHYSICAL THERAPIST

## 2019-07-16 PROCEDURE — 97110 THERAPEUTIC EXERCISES: CPT | Performed by: PHYSICAL THERAPIST

## 2019-07-16 NOTE — THERAPY RE-EVALUATION
"    Outpatient Physical Therapy Ortho Re-Evaluation   [place]     Patient Name: Robles Rodrigues  : 1970  MRN: [id number]  Today's Date: 2019      Visit Date: 2019    Patient Active Problem List   Diagnosis   • Fast heart beat   • Tachycardia   • Edema   • Benign essential hypertension   • Chronic coronary artery disease   • Hyperlipidemia   • Neck pain on right side   • Spinal stenosis in cervical region        Past Medical History:   Diagnosis Date   • Anxiety    • Coronary artery disease    • GERD (gastroesophageal reflux disease)    • Hyperlipidemia    • Hypertension    • Irritable bowel    • Mental impairment    • Migraines    • Obesity    • Tachycardia     Dr Sanchez has seen 2016   • Umbilical hernia     sched repair        Past Surgical History:   Procedure Laterality Date   • CHOLECYSTECTOMY     • COLONOSCOPY N/A 2016    Procedure: COLONOSCOPY w/ polypectomy;  Surgeon: Laura Galloway MD;  Location: [facility] OR;  Service:    • ENDOSCOPY N/A 2016    Procedure: ESOPHAGOGASTRODUODENOSCOPY w/ biopsies;  Surgeon: Laura Galloway MD;  Location: [facility] OR;  Service:    • UMBILICAL HERNIA REPAIR N/A 2017    Procedure: UMBILICAL HERNIA REPAIR;  Surgeon: Ramo Medina MD;  Location: [facility] OR;  Service:    • URETHRAL DILATION         Visit Dx:     ICD-10-CM ICD-9-CM   1. Disc degeneration, lumbar M51.36 722.52             PT Ortho     Row Name 19 1600       Subjective Comments    Subjective Comments  Patient and his mother report that he continues to have soreness in low back area but has been improving since initiation of therapy.   Patient still has difficulty with bending forward and needs help dressing lower body.   He is now able to roll over and transfer out of bed without difficulty.  Patient reports that he has minimal to no pain at times.  Pain at worst with attempts to bend forward is 5/10.  Parent and patient feel therapy is helping, she reports \"he used to " "cry because of the pain\"  and they would like to continue with therapy.    -SP       Posture/Observations    Forward Head  Mild  -SP    Lumbar lordosis  Normal  -SP    Observations  Edema;Ecchymosis/bruising  -SP    Posture/Observations Comments  Edema continues to be present over central lumbar area but is significantly decreased  -SP       Neural Tension Signs- Lower Quarter Clearing    SLR  Bilateral:;Negative  -SP       Sensory Screen for Light Touch- Lower Quarter Clearing    L1 (inguinal area)  Bilateral:;Intact  -SP    L2 (anterior mid thigh)  Bilateral:;Intact  -SP    L3 (distal anterior thigh)  Bilateral:;Intact  -SP    L4 (medial lower leg/foot)  Bilateral:;Intact  -SP    L5 (lateral lower leg/great toe)  Bilateral:;Intact  -SP    S1 (bottom of foot)  Bilateral:;Intact  -SP       Myotomal Screen- Lower Quarter Clearing    Hip flexion (L2)  Bilateral:;4 (Good)  -SP    Knee extension (L3)  Bilateral:;4+ (Good +)  -SP    Ankle DF (L4)  Bilateral:;4 (Good);4+ (Good +)  -SP    Great toe extension (L5)  Bilateral:;4 (Good);4+ (Good +)  -SP    Ankle PF (S1)  Bilateral:;4 (Good)  -SP    Knee flexion (S2)  Bilateral:;4+ (Good +)  -SP       Lumbar/SI Special Tests    SLR (Neural Tension)  Bilateral:;Negative  -SP       Lumbosacral Palpation    SI  Bilateral:;Tender  -SP    Lumbosacral Segment  Bilateral:;Tender;Swollen  -SP    Erector Spinae (Paraspinals)  Bilateral:;Tender  -SP       Head/Neck/Trunk    Trunk Extension AROM  unable to extend: flexes knees in attempt to extend  -SP    Trunk Flexion AROM  decreased by 75% from full range with complaints of pain central lumbar spine  -SP    Trunk Lt Lateral Flexion AROM  decreased by 50% from full range with complaints of pain at end range  -SP    Trunk Rt Lateral Flexion AROM  decreased by 50% from full range with complaints of pain at end range  -SP       MMT Neck/Trunk    Trunk Flexion MMT, Gross Movement  (3+/5) fair plus  -SP    Left Pelvic Elevation MMT, Gross " Movement  (3-/5) fair minus  -SP    Right Pelvic Elevation MMT, Gross Movement:  (3-/5) fair minus  -SP       Sensation    Sensation WNL?  WFL  -SP       Lower Extremity Flexibility    Hamstrings  Bilateral:;Mildly limited  -SP    Hip External Rotators  Bilateral:;Mildly limited  -SP    Hip Internal Rotators  Bilateral:;Mildly limited  -SP       Pathomechanics    Spine Pathomechanics  Bends knees with attempted lumbar extension  -SP       Transfers    Sit-Stand [place] (Transfers)  independent  -SP    Stand-Sit [place] (Transfers)  independent  -SP       Gait/Stairs Assessment/Training    [place] Level (Gait)  independent  -SP    Deviations/Abnormal Patterns (Gait)  stride length decreased  -SP    Bilateral Gait Deviations  forward flexed posture  -SP    Ascending Technique (Stairs)  step-to-step  -SP    Descending Technique (Stairs)  step-to-step  -SP      User Key  (r) = Recorded By, (t) = Taken By, (c) = Cosigned By    Initials Name Provider Type    Xena Horton, PT Physical Therapist                      Therapy Education  Given: HEP  Program: Reinforced  How Provided: Verbal  Provided to: Patient, Caregiver  Level of Understanding: Verbalized, Demonstrated     PT OP Goals     Row Name 07/16/19 1030          PT Short Term Goals    STG Date to Achieve  07/31/19  -SP     STG 1  Patient demonstrates appropriate transfers sup/sit without cues for technique  -SP     STG 1 Progress  Met  -SP     STG 2  Patient exhibit minimal tenderness to palpation lumbar paraspinals  -SP     STG 2 Progress  Ongoing;Progressing  -SP     STG 3  Patient reports decreased pain level to <3/10 at worst with ambulation >30 min.    -SP     STG 3 Progress  Partially Met;Ongoing pain with forward flexion to 5/10  -SP     STG 4  Patient is able to dress self and complete self care independently, unlimited by back pain.  -SP     STG 4 Progress  Ongoing  -SP        Long Term Goals    LTG Date to Achieve  07/10/19   -SP     LTG 1  Patient reports decreased pain level to <2/10 with home and community mobility and ADLs  -SP     LTG 1 Progress  Partially Met;Ongoing  -SP     LTG 2  Patient demonstrates increased trunk strength by one muscle grade  -SP     LTG 2 Progress  Partially Met;Ongoing  -SP     LTG 3  Patient independent with HEP  -SP     LTG 3 Progress  Ongoing  -SP        Time Calculation    PT Goal Re-Cert Due Date  08/15/19  -SP       User Key  (r) = Recorded By, (t) = Taken By, (c) = Cosigned By    Initials Name Provider Type    Xena Horton, PT Physical Therapist          PT Assessment/Plan     Row Name 07/16/19 3123          PT Assessment    Assessment Comments  Patient with continued lumbar sorness and edema but overall decrease in symptoms.  Patient exhibits decreased pain with transfers but continues difficulty tolerating forward flexion.  Patient is making progress toward all goals and would likely continue to make gains with continued therapy.   -SP        PT Plan    PT Frequency  2x/week;1x/week  -SP     Predicted Duration of Therapy Intervention (Therapy Eval)  3-4 weeks  -SP     PT Plan Comments  Continue to progress 2x week to  1 week for 3-4 weeks to progress strength, ROM and mobility.   -SP       User Key  (r) = Recorded By, (t) = Taken By, (c) = Cosigned By    Initials Name Provider Type    Xena Horton, PT Physical Therapist          Modalities     Row Name 07/16/19 1600             Ice    Location  lumbar/sacral area w/ CP - pt supine with bolster under knees  -SP      Rx Minutes  15 mins  -SP      Ice S/P Rx  Yes  -SP         Ultrasound 07066    Location  L/S area - pt (R) sidelying with pillow between knees  -SP      Duty Cycle  100  -SP      Intensity - Wts/cm  1.5  -SP         ELECTRICAL STIMULATION    Attended/Unattended  Unattended  -SP      Stimulation Type  IFC  -SP      Location/Electrode Placement/Other  (B) L/S area - supine with CP  -SP        User Key  (r) =  "Recorded By, (t) = Taken By, (c) = Cosigned By    Initials Name Provider Type    Xena Horton, PT Physical Therapist        Exercises     Row Name 07/16/19 1600             Subjective Comments    Subjective Comments  Patient and his mother report that he continues to have soreness in low back area but has been improving since initiation of therapy.   Patient still has difficulty with bending forward and needs help dressing lower body.   He is now able to roll over and transfer out of bed without difficulty.  Patient reports that he has minimal to no pain at times.  Pain at worst with attempts to bend forward is 5/10.  Parent and patient feel therapy is helping, she reports \"he used to cry because of the pain\"  and they would like to continue with therapy.    -SP         Exercise 1    Exercise Name 1  (B) SKTC  -SP      Cueing 1  Verbal  -SP      Reps 1  3  -SP      Time 1  20 sec  -SP         Exercise 2    Exercise Name 2  (B) pirformis stretch  -SP      Cueing 2  Verbal  -SP      Reps 2  3  -SP      Time 2  20 sec  -SP         Exercise 3    Exercise Name 3  (B) LTR  -SP      Cueing 3  Verbal  -SP      Reps 3  10 each  -SP         Exercise 4    Exercise Name 4  Abdominal bracing  -SP      Cueing 4  Verbal  -SP      Reps 4  10  -SP      Time 4  5 sec  -SP         Exercise 5    Exercise Name 5  Hamstring stretch with sheet  -SP      Cueing 5  Verbal;Tactile  -SP      Reps 5  3  -SP      Time 5  10 sec  -SP         Exercise 6    Exercise Name 6  Abd. bracing with ball squeeze  -SP      Cueing 6  Verbal;Tactile  -SP      Reps 6  10  -SP      Time 6  5 sec  -SP         Exercise 7    Exercise Name 7  Abd. bracing w/ hip abd vs theraband  -SP      Cueing 7  Verbal;Tactile  -SP      Reps 7  10  -SP      Time 7  5 sec - blue  -SP        User Key  (r) = Recorded By, (t) = Taken By, (c) = Cosigned By    Initials Name Provider Type    Xena Horton, PT Physical Therapist                            "       Time Calculation:     Start Time: 1030  Stop Time: 1135  Time Calculation (min): 65 min     Therapy Charges for Today     Code Description Service Date Service Provider Modifiers Qty    55442918389 HC PT ELECTRICAL STIM UNATTENDED 7/16/2019 Xena Monroy, PT  1    [id number]  PT ULTRASOUND EA 15 MIN 7/16/2019 Xena Monroy, PT GP 1    [id number]  PT THER PROC EA 15 MIN 7/16/2019 Xena Monroy, PT GP 1                    Xena Monroy, PT  7/16/2019

## 2019-07-18 ENCOUNTER — APPOINTMENT (OUTPATIENT)
Dept: PHYSICAL THERAPY | Facility: HOSPITAL | Age: 49
End: 2019-07-18

## 2019-07-18 ENCOUNTER — DOCUMENTATION (OUTPATIENT)
Dept: PHYSICAL THERAPY | Facility: HOSPITAL | Age: 49
End: 2019-07-18

## 2019-07-22 ENCOUNTER — HOSPITAL ENCOUNTER (OUTPATIENT)
Dept: PHYSICAL THERAPY | Facility: HOSPITAL | Age: 49
Setting detail: THERAPIES SERIES
Discharge: HOME | End: 2019-07-22

## 2019-07-22 DIAGNOSIS — M51.36 DISC DEGENERATION, LUMBAR: Primary | ICD-10-CM

## 2019-07-22 PROCEDURE — G0283 ELEC STIM OTHER THAN WOUND: HCPCS

## 2019-07-22 PROCEDURE — 97035 APP MDLTY 1+ULTRASOUND EA 15: CPT

## 2019-07-22 PROCEDURE — 97110 THERAPEUTIC EXERCISES: CPT

## 2019-07-22 NOTE — THERAPY TREATMENT NOTE
Outpatient Physical Therapy Ortho Treatment Note   Jocy York     Patient Name: Robles Rodrigues  : 1970  MRN: [id number]  Today's Date: 2019      Visit Date: 2019    Visit Dx:    ICD-10-CM ICD-9-CM   1. Disc degeneration, lumbar M51.36 722.52       Patient Active Problem List   Diagnosis   • Fast heart beat   • Tachycardia   • Edema   • Benign essential hypertension   • Chronic coronary artery disease   • Hyperlipidemia   • Neck pain on right side   • Spinal stenosis in cervical region        Past Medical History:   Diagnosis Date   • Anxiety    • Coronary artery disease    • GERD (gastroesophageal reflux disease)    • Hyperlipidemia    • Hypertension    • Irritable bowel    • Mental impairment    • Migraines    • Obesity    • Tachycardia     Dr Sanchez has seen 2016   • Umbilical hernia     sched repair        Past Surgical History:   Procedure Laterality Date   • CHOLECYSTECTOMY     • COLONOSCOPY N/A 2016    Procedure: COLONOSCOPY w/ polypectomy;  Surgeon: Laura Galloway MD;  Location: [facility] OR;  Service:    • ENDOSCOPY N/A 2016    Procedure: ESOPHAGOGASTRODUODENOSCOPY w/ biopsies;  Surgeon: Laura Galloway MD;  Location: [facility] OR;  Service:    • UMBILICAL HERNIA REPAIR N/A 2017    Procedure: UMBILICAL HERNIA REPAIR;  Surgeon: Ramo Medina MD;  Location: [facility] OR;  Service:    • URETHRAL DILATION         PT Ortho     Row Name 19 1000       Subjective Comments    Subjective Comments  pt reports he is doing HEP 2x per day.  mom reports pt moving better.  Pt reports worst pain with bending over  -KM       Subjective Pain    Able to rate subjective pain?  yes  -KM    Pre-Treatment Pain Level  2  -KM    Post-Treatment Pain Level  1  -KM      User Key  (r) = Recorded By, (t) = Taken By, (c) = Cosigned By    Initials Name Provider Type    Afsaneh Gibbs PTA Physical Therapy Assistant                      PT Assessment/Plan     Row Name 19 1041           PT Assessment    Functional Limitations  Performance in leisure activities;Limitations in functional capacity and performance;Performance in self-care ADL  -KM     Impairments  Gait;Impaired flexibility;Muscle strength;Pain  -KM     Assessment Comments  -- pt and mom report pt is doing better.  Pt's mom reports pt is able to move legs more with ex now.  pt reports overall better except with bending over.  Education provided on bending  -KM       User Key  (r) = Recorded By, (t) = Taken By, (c) = Cosigned By    Initials Name Provider Type    Afsaneh Gibbs PTA Physical Therapy Assistant          Modalities     Row Name 07/22/19 1000             Ice    Ice Applied  Yes  -KM      Location  lumbar/sacral area w/ CP - pt supine with bolster under knees  -KM      Rx Minutes  15 mins  -KM      Ice S/P Rx  Yes  -KM         Ultrasound 48420    Location  L/S area - pt (R) sidelying with pillow between knees  -KM      Duty Cycle  100  -KM      Intensity - Wts/cm  1.5  -KM         ELECTRICAL STIMULATION    Attended/Unattended  Unattended  -KM      Stimulation Type  IFC  -KM      Location/Electrode Placement/Other  (B) L/S area - supine with CP  -KM        User Key  (r) = Recorded By, (t) = Taken By, (c) = Cosigned By    Initials Name Provider Type    Afsaneh Gibbs PTA Physical Therapy Assistant        Exercises     Row Name 07/22/19 1000             Subjective Comments    Subjective Comments  pt reports he is doing HEP 2x per day.  mom reports pt moving better.  Pt reports worst pain with bending over  -KM         Subjective Pain    Able to rate subjective pain?  yes  -KM      Pre-Treatment Pain Level  2  -KM      Post-Treatment Pain Level  1  -KM         Exercise 1    Exercise Name 1  (B) SKTC  -KM      Cueing 1  Verbal  -KM      Reps 1  3  -KM      Time 1  20 sec  -KM         Exercise 2    Exercise Name 2  (B) pirformis stretch  -KM      Cueing 2  Verbal  -KM      Reps 2  3  -KM      Time 2  20 sec  -KM          Exercise 3    Exercise Name 3  (B) LTR  -KM      Cueing 3  Verbal  -KM      Reps 3  10 each  -KM         Exercise 4    Exercise Name 4  Abdominal bracing  -KM      Cueing 4  Verbal  -KM      Reps 4  10  -KM      Time 4  5 sec  -KM         Exercise 5    Exercise Name 5  Hamstring stretch with sheet  -KM      Cueing 5  Verbal;Tactile  -KM      Reps 5  3  -KM      Time 5  10 sec  -KM         Exercise 6    Exercise Name 6  Abd. bracing with ball squeeze  -KM      Cueing 6  Verbal;Tactile  -KM      Reps 6  10  -KM      Time 6  5 sec  -KM         Exercise 7    Exercise Name 7  Abd. bracing w/ hip abd vs theraband  -KM      Cueing 7  Verbal;Tactile  -KM      Reps 7  10  -KM      Time 7  5 sec - blue  -KM        User Key  (r) = Recorded By, (t) = Taken By, (c) = Cosigned By    Initials Name Provider Type     Afsaneh Resendiz PTA Physical Therapy Assistant                   Cues with hamstring stretch and piriformis stretch to perform correctly.  Pt reports he is performing HEP 2x per day.          Therapy Education  Education Details: (education provided on lifting and cues with HEp)  Given: HEP, Symptoms/condition management, Posture/body mechanics  Program: Reinforced  How Provided: Verbal, Demonstration  Provided to: Patient  Level of Understanding: Teach back education performed, Verbalized, Demonstrated              Time Calculation:   Start Time: 1000  Stop Time: 1056  Time Calculation (min): 56 min  Therapy Charges for Today     Code Description Service Date Service Provider Modifiers Qty    05474034080 HC PT ULTRASOUND EA 15 MIN 7/22/2019 Afsaneh Resendiz PTA GP 1    04282997332 HC PT ELECTRICAL STIM UNATTENDED 7/22/2019 Afsaneh Resendiz PTA  1    24414156365 HC PT THER PROC EA 15 MIN 7/22/2019 Afsaneh Resendiz PTA GP 1                    Afsaneh Resendiz PTA  7/22/2019

## 2019-07-26 ENCOUNTER — DOCUMENTATION (OUTPATIENT)
Dept: PHYSICAL THERAPY | Facility: HOSPITAL | Age: 49
End: 2019-07-26

## 2019-07-26 NOTE — SIGNIFICANT NOTE
Pt and pt's mother arrived for treatment.  Pt states he continues to feel better overall and is tolerating his HEP but is still having pain with trunk flexion.  Pt's mother states he recently needed assistance with finishing his bath and getting out of the tub due to pain - states this has not happened since he had started therapy.  Pt's mother also reports edema central lumbar looks like it has increased.  Mother didn't want to call and cancel appt because she wanted therapist to see it.  Agree with pt's mother that edema has increased to size when therapy was initiated. Pt's mother continues to say she would like to hold therapy until he follows up with MD 8/02/19.  Encouraged pt to continue with cold packs PRN and to continue exercises as tolerated, keeping them pain free.    Pt's mother to follow up with therapy after MD appt.  Asked pt/mother to obtain new MD order if therapy is to continue

## 2022-05-18 ENCOUNTER — PREP FOR SURGERY (OUTPATIENT)
Dept: OTHER | Facility: HOSPITAL | Age: 52
End: 2022-05-18

## 2022-05-18 ENCOUNTER — TELEPHONE (OUTPATIENT)
Dept: GASTROENTEROLOGY | Facility: CLINIC | Age: 52
End: 2022-05-18

## 2022-05-18 DIAGNOSIS — Z86.010 PERSONAL HISTORY OF COLONIC POLYPS: Primary | ICD-10-CM

## 2022-05-18 NOTE — TELEPHONE ENCOUNTER
Per Dr. Valle Note: 10/03/18  EGD 8/13/2018  Post-operative Diagnosis:   1. Normal esophagus. No esophagitis seen. Distal and proximal esophageal biopsy done  2. Mild antral gastritis. Biopsy from antrum done for ZACH test to r/o H pylori. (NEGATIVE).   3. Retained gastric residuals, possible gastropatresis  4. Normal duodenum. Small bowel biopsy done to r/o celiac disease.  5. No abnormality seen to explain neck pain    PATH:  DIAGNOSIS:  A.  SMALL BOWEL, BIOPSY:        FRAGMENTS OF SMALL BOWEL MUCOSA WITH PRESERVED VILLOUS ARCHITECTURE.        NEGATIVE FOR INCREASED INTRAEPITHELIAL LYMPHOCYTES.        NEGATIVE FOR DYSPLASIA.     B.  DISTAL ESOPHAGUS, BIOPSY:        FRAGMENTS OF SQUAMOUS MUCOSA WITH MILD CHRONIC INFLAMMATION.        COLUMNAR MUCOSA ABSENT.        NEGATIVE FOR DYSPLASIA.          C.  PROXIMAL ESOPHAGUS, BIOPSY:        BENIGN FRAGMENTS OF SQUAMOUS MUCOSA.        NEGATIVE FOR DYSPLASIA.     GES 9/1/2018 normal

## 2022-05-18 NOTE — TELEPHONE ENCOUNTER
FAST TRACK - 5 YEAR RECALL, OVERDUE - LAST EGD & COLONOSCOPY 12/14/2016 - PERSONAL HISTORY POLYPS - SCHEDULE AT [place].    DID KHADIJAH:  NAUSEA, HEART BURN, CONSTIPATION & UNINTENTIONAL WEIGH LOSS.

## 2022-05-19 NOTE — TELEPHONE ENCOUNTER
SPOKE WITH DAVID, POA - SCHEDULED AT [place] WITH DR. VIZCAINO 06/06/2022 AT 9:30AM - ARRIVE 8:30AM.  E-MAIL INSTRUCTIONS [email or web address].

## 2022-05-27 ENCOUNTER — PREP FOR SURGERY (OUTPATIENT)
Dept: OTHER | Facility: HOSPITAL | Age: 52
End: 2022-05-27

## 2022-05-27 DIAGNOSIS — Z86.010 PERSONAL HISTORY OF COLONIC POLYPS: Primary | ICD-10-CM

## 2022-05-31 ENCOUNTER — PREP FOR SURGERY (OUTPATIENT)
Dept: OTHER | Facility: HOSPITAL | Age: 52
End: 2022-05-31

## 2022-05-31 DIAGNOSIS — Z86.010 PERSONAL HISTORY OF COLONIC POLYPS: Primary | ICD-10-CM

## 2022-06-01 ENCOUNTER — ANESTHESIA EVENT (OUTPATIENT)
Dept: PERIOP | Facility: HOSPITAL | Age: 52
End: 2022-06-01

## 2022-06-01 PROBLEM — Z86.010 PERSONAL HISTORY OF COLONIC POLYPS: Status: ACTIVE | Noted: 2022-06-01

## 2022-06-01 PROBLEM — Z86.0100 PERSONAL HISTORY OF COLONIC POLYPS: Status: ACTIVE | Noted: 2022-06-01

## 2022-06-03 RX ORDER — CYCLOBENZAPRINE HCL 10 MG
10 TABLET ORAL DAILY
COMMUNITY

## 2022-06-03 RX ORDER — LEVOCETIRIZINE DIHYDROCHLORIDE 5 MG/1
5 TABLET, FILM COATED ORAL EVERY EVENING
COMMUNITY

## 2022-06-06 ENCOUNTER — ANESTHESIA (OUTPATIENT)
Dept: PERIOP | Facility: HOSPITAL | Age: 52
End: 2022-06-06

## 2022-06-06 ENCOUNTER — TELEPHONE (OUTPATIENT)
Dept: GASTROENTEROLOGY | Facility: CLINIC | Age: 52
End: 2022-06-06

## 2022-06-06 NOTE — TELEPHONE ENCOUNTER
PATIENT HAD EMERGENCY DENTAL VISIT WITH COUPLE OF  TEETH EXTRACTIONS AND WAS GIVEN ANTIBIOTICS.  NEED TO RESCHEDULE.  SCHEDULED TODAY, 06/06/2022 WITH DR. VIZCAINO.    RESCHEDULED TO [place] WITH DR. VIZCAINO ON 07/06/2022 AT 12:30PM - ARRIVE 11:30AM.  WILL MAIL NEW INSTRUCTIONS.

## 2022-06-17 ENCOUNTER — TELEPHONE (OUTPATIENT)
Dept: GASTROENTEROLOGY | Facility: CLINIC | Age: 52
End: 2022-06-17

## 2022-06-17 NOTE — TELEPHONE ENCOUNTER
Spoke with CONNER Martin.  Scheduled for colonoscopy on 07/06/2022.  Need to reschedule.    Scheduled at [place] with Dr. Gar 07/19/2022 at 9:30am - arrive 8:30am.  She corrected her copy of instructions.

## 2022-07-19 ENCOUNTER — HOSPITAL ENCOUNTER (OUTPATIENT)
Facility: HOSPITAL | Age: 52
Setting detail: HOSPITAL OUTPATIENT SURGERY
Discharge: HOME | End: 2022-07-19
Attending: INTERNAL MEDICINE | Admitting: INTERNAL MEDICINE

## 2022-07-19 VITALS
SYSTOLIC BLOOD PRESSURE: 121 MMHG | RESPIRATION RATE: 16 BRPM | DIASTOLIC BLOOD PRESSURE: 86 MMHG | HEIGHT: 71 IN | WEIGHT: 236 LBS | OXYGEN SATURATION: 96 % | TEMPERATURE: 98 F | BODY MASS INDEX: 33.04 KG/M2 | HEART RATE: 65 BPM

## 2022-07-19 PROCEDURE — 25010000002 PROPOFOL 10 MG/ML EMULSION: Performed by: REGISTERED NURSE

## 2022-07-19 PROCEDURE — G0105 COLORECTAL SCRN; HI RISK IND: HCPCS | Performed by: INTERNAL MEDICINE

## 2022-07-19 RX ORDER — DEXMEDETOMIDINE HYDROCHLORIDE 100 UG/ML
INJECTION, SOLUTION INTRAVENOUS AS NEEDED
Status: DISCONTINUED | OUTPATIENT
Start: 2022-07-19 | End: 2022-07-19 | Stop reason: SURG

## 2022-07-19 RX ORDER — PROPOFOL 10 MG/ML
VIAL (ML) INTRAVENOUS AS NEEDED
Status: DISCONTINUED | OUTPATIENT
Start: 2022-07-19 | End: 2022-07-19 | Stop reason: SURG

## 2022-07-19 RX ORDER — LIDOCAINE HYDROCHLORIDE 10 MG/ML
0.5 INJECTION, SOLUTION EPIDURAL; INFILTRATION; INTRACAUDAL; PERINEURAL ONCE AS NEEDED
Status: DISCONTINUED | OUTPATIENT
Start: 2022-07-19 | End: 2022-07-19 | Stop reason: HOSPADM

## 2022-07-19 RX ORDER — SODIUM CHLORIDE 0.9 % (FLUSH) 0.9 %
10 SYRINGE (ML) INJECTION EVERY 12 HOURS SCHEDULED
Status: DISCONTINUED | OUTPATIENT
Start: 2022-07-19 | End: 2022-07-19 | Stop reason: HOSPADM

## 2022-07-19 RX ORDER — SODIUM CHLORIDE 0.9 % (FLUSH) 0.9 %
10 SYRINGE (ML) INJECTION AS NEEDED
Status: DISCONTINUED | OUTPATIENT
Start: 2022-07-19 | End: 2022-07-19 | Stop reason: HOSPADM

## 2022-07-19 RX ORDER — SODIUM CHLORIDE, SODIUM LACTATE, POTASSIUM CHLORIDE, CALCIUM CHLORIDE 600; 310; 30; 20 MG/100ML; MG/100ML; MG/100ML; MG/100ML
9 INJECTION, SOLUTION INTRAVENOUS CONTINUOUS
Status: DISCONTINUED | OUTPATIENT
Start: 2022-07-19 | End: 2022-07-19 | Stop reason: HOSPADM

## 2022-07-19 RX ORDER — SODIUM CHLORIDE 9 MG/ML
40 INJECTION, SOLUTION INTRAVENOUS AS NEEDED
Status: DISCONTINUED | OUTPATIENT
Start: 2022-07-19 | End: 2022-07-19 | Stop reason: HOSPADM

## 2022-07-19 RX ORDER — LIDOCAINE HYDROCHLORIDE 20 MG/ML
INJECTION, SOLUTION INFILTRATION; PERINEURAL AS NEEDED
Status: DISCONTINUED | OUTPATIENT
Start: 2022-07-19 | End: 2022-07-19 | Stop reason: SURG

## 2022-07-19 RX ADMIN — PROPOFOL 100 MG: 10 INJECTION, EMULSION INTRAVENOUS at 09:55

## 2022-07-19 RX ADMIN — SODIUM CHLORIDE, POTASSIUM CHLORIDE, SODIUM LACTATE AND CALCIUM CHLORIDE 9 ML/HR: 600; 310; 30; 20 INJECTION, SOLUTION INTRAVENOUS at 09:05

## 2022-07-19 RX ADMIN — DEXMEDETOMIDINE 4 MCG: 100 INJECTION, SOLUTION, CONCENTRATE INTRAVENOUS at 09:55

## 2022-07-19 RX ADMIN — PROPOFOL 100 MG: 10 INJECTION, EMULSION INTRAVENOUS at 09:52

## 2022-07-19 RX ADMIN — LIDOCAINE HYDROCHLORIDE 50 MG: 20 INJECTION, SOLUTION INFILTRATION; PERINEURAL at 09:52

## 2022-07-19 RX ADMIN — PROPOFOL 100 MG: 10 INJECTION, EMULSION INTRAVENOUS at 06:45

## 2022-07-19 NOTE — BRIEF OP NOTE
COLONOSCOPY  Progress Note    Robles Rodrigues  7/19/2022    Pre-op Diagnosis:   Personal history of colonic polyps [Z86.010]       Post-Op Diagnosis Codes:     * Personal history of colonic polyps [Z86.010]    Procedure/CPT® Codes:        Procedure(s):  COLONOSCOPY    Surgeon(s):  Spencer Gar MD    Anesthesia: Monitored Anesthesia Care    Staff:   Circulator: Penny Perry RN  Scrub Person: Sunitha Daigle         Estimated Blood Loss: none    Urine Voided: * No values recorded between 7/19/2022  9:51 AM and 7/19/2022 10:13 AM *    Specimens:                None          Drains: * No LDAs found *    Findings: Mild sigmoid diverticulosis, otherwise examination was completely normal up to cecum and terminal ileum.  The quality of the prep was excellent.  No polyps were seen.        Complications: None          Spencer Gar MD     Date: 7/19/2022  Time: 10:15 EDT

## 2022-07-19 NOTE — ANESTHESIA PREPROCEDURE EVALUATION
Anesthesia Evaluation     Patient summary reviewed and Nursing notes reviewed   no history of anesthetic complications:  NPO Solid Status: > 8 hours  NPO Liquid Status: > 8 hours           Airway   Mallampati: II  TM distance: >3 FB  Neck ROM: full  No difficulty expected  Dental - normal exam     Pulmonary - negative pulmonary ROS and normal exam    breath sounds clear to auscultation  Cardiovascular - normal exam  Exercise tolerance: good (4-7 METS)    Rhythm: regular  Rate: normal    (+) hypertension, CAD, hyperlipidemia,       Neuro/Psych  (+) headaches, psychiatric history (Mentel impairment) Anxiety,    GI/Hepatic/Renal/Endo    (+) obesity,  GERD,      Musculoskeletal     (+) back pain (Norco 7.5 3-4x/day),   Abdominal   (+) obese,    Substance History - negative use     OB/GYN          Other - negative ROS                       Anesthesia Plan    ASA 2     MAC     intravenous induction     Anesthetic plan, risks, benefits, and alternatives have been provided, discussed and informed consent has been obtained with: patient.  Use of blood products discussed with patient  Consented to blood products.       CODE STATUS:

## 2022-07-19 NOTE — H&P
Patient Care Team:  Aron White MD as PCP - General (Family Medicine)    CHIEF COMPLAINT: Personal history of colon polyps    HISTORY OF PRESENT ILLNESS:    Patient is presented for a surveillance colonoscopy.  He has personal history of colonic adenomas removed in the past      Past Medical History:   Diagnosis Date   • Anxiety    • Coronary artery disease    • GERD (gastroesophageal reflux disease)    • Hyperlipidemia    • Hypertension    • Irritable bowel    • Mental impairment    • Migraines    • Obesity    • Tachycardia     Dr Sanchez has seen 7/2016   • Umbilical hernia     sched repair     Past Surgical History:   Procedure Laterality Date   • CHOLECYSTECTOMY     • COLONOSCOPY N/A 12/14/2016    Procedure: COLONOSCOPY w/ polypectomy;  Surgeon: Laura Galloway MD;  Location: [facility] OR;  Service:    • ENDOSCOPY N/A 12/14/2016    Procedure: ESOPHAGOGASTRODUODENOSCOPY w/ biopsies;  Surgeon: Laura Galloway MD;  Location: [facility] OR;  Service:    • UMBILICAL HERNIA REPAIR N/A 1/20/2017    Procedure: UMBILICAL HERNIA REPAIR;  Surgeon: Ramo Medina MD;  Location: [facility] OR;  Service:    • URETHRAL DILATION       Family History   Problem Relation Age of Onset   • Depression Father    • Coronary artery disease Father    • Hypertension Father    • Heart disease Father    • Hypertension Sister    • Hypertension Brother      Social History     Tobacco Use   • Smoking status: Never Smoker   • Smokeless tobacco: Never Used   Substance Use Topics   • Alcohol use: No   • Drug use: No     Medications Prior to Admission   Medication Sig Dispense Refill Last Dose   • aspirin 81 MG EC tablet Take 81 mg by mouth Daily.   7/14/2022   • busPIRone (BUSPAR) 10 MG tablet Take 15 mg by mouth 2 (Two) Times a Day As Needed (anxiety).   7/18/2022 at 2000   • cyclobenzaprine (FLEXERIL) 10 MG tablet Take 10 mg by mouth Daily.   7/18/2022 at Unknown time   • HYDROcodone-acetaminophen (NORCO) 7.5-325 MG per tablet Take 1 tablet by mouth  "Every 6 (Six) Hours As Needed for Moderate Pain .   7/18/2022 at 2350   • levocetirizine (XYZAL) 5 MG tablet Take 5 mg by mouth Every Evening.   7/18/2022 at Unknown time   • metoprolol succinate XL (TOPROL-XL) 25 MG 24 hr tablet Take 25 mg by mouth 2 (Two) Times a Day.   7/19/2022 at 0800   • pantoprazole (PROTONIX) 40 MG EC tablet Take 40 mg by mouth 2 (Two) Times a Day.   7/18/2022 at 2000   • rosuvastatin (CRESTOR) 10 MG tablet Take 10 mg by mouth Every Night.   7/18/2022 at 2000   • silodosin (RAPAFLO) 8 MG capsule capsule Take 8 mg by mouth Daily.   7/18/2022 at Unknown time   • sucralfate (CARAFATE) 1 GM/10ML suspension Take 1 g by mouth 4 (Four) Times a Day.   7/18/2022 at 2200     Allergies:  Bee venom, Gabapentin, Influenza vaccine live, and Influenza vaccines    REVIEW OF SYSTEMS:  Please see the above history of present illness for pertinent positives and negatives.  The remainder of the patient's systems have been reviewed and are negative.  Patient denies any history of overt GI bleed or change in bowel habits    Vital Signs  Temp:  [98.3 °F (36.8 °C)] 98.3 °F (36.8 °C)  Heart Rate:  [84] 84  Resp:  [18] 18  BP: (137)/(97) 137/97    Flowsheet Rows    Flowsheet Row First Filed Value   Admission Height 180.3 cm (71\") Documented at 07/18/2022 0912   Admission Weight 107 kg (235 lb) Documented at 07/18/2022 0912           Physical Exam:  Physical Exam   Constitutional: Patient appears well-developed and well-nourished and in no acute distress   HEENT:   Head: Normocephalic and atraumatic.   Eyes:  Pupils are equal, round, and reactive to light. EOM are intact. Sclera are anicteric and non-injected.  Mouth and Throat: Patient has moist mucous membranes. Oropharynx is clear of any erythema or exudate.     Neck: Neck supple. No JVD present. No thyromegaly present. No lymphadenopathy present.  Cardiovascular: Regular rate, regular rhythm, S1 normal and S2 normal.  Exam reveals no gallop and no friction rub.  " No murmur heard.  Pulmonary/Chest: Lungs are clear to auscultation bilaterally. No respiratory distress. No wheezes. No rhonchi. No rales.   Abdominal: Soft. Bowel sounds are normal. No distension and no mass. There is no hepatosplenomegaly. There is no tenderness.   Musculoskeletal: Normal Muscle tone  Extremities: No edema. Pulses are palpable in all 4 extremities.  Neurological: Patient is alert and oriented to person, place, and time. Cranial nerves II-XII are grossly intact with no focal deficits.  Skin: Skin is warm. No rash noted. Nails show no clubbing.  No cyanosis or erythema.    Debilities/Disabilities Identified: None  Emotional Behavior: Appropriate     Results Review:    I reviewed the patient's new clinical results.  Lab Results (most recent)     None          Imaging Results (Most Recent)     None        not reviewed    ECG/EMG Results (most recent)     None        not reviewed    Assessment & Plan     Patient with a history of colonic adenomas removed in the past now presenting for a surveillance colonoscopy.  The pros and cons of the procedure and potential risks and complications were discussed with the patient's POA and she was reassured.    I discussed the patients findings and my recommendations with patient and reassured..     Spencer Gar MD  07/19/22  09:43 EDT

## 2022-07-19 NOTE — ANESTHESIA POSTPROCEDURE EVALUATION
Patient: Robles Rodrigues    Procedure Summary     Date: 07/19/22 Room / Location: [facility] ENDOSCOPY 2 /  [facility] OR    Anesthesia Start: 0952 Anesthesia Stop: 1012    Procedure: COLONOSCOPY (N/A ) Diagnosis:       Personal history of colonic polyps      (Personal history of colonic polyps [Z86.010])    Surgeons: Spencer Gar MD Provider: Tu Kirby CRNA    Anesthesia Type: MAC ASA Status: 2          Anesthesia Type: MAC    Vitals  Vitals Value Taken Time   /82 07/19/22 1014   Temp 98 °F (36.7 °C) 07/19/22 1014   Pulse 74 07/19/22 1014   Resp 16 07/19/22 1014   SpO2 96 % 07/19/22 1014           Post Anesthesia Care and Evaluation    Patient location during evaluation: PHASE II  Patient participation: complete - patient participated  Level of consciousness: awake and alert  Pain score: 0  Pain management: adequate    Airway patency: patent  Anesthetic complications: No anesthetic complications  PONV Status: none  Cardiovascular status: acceptable  Respiratory status: acceptable  Hydration status: acceptable

## 2022-08-30 ENCOUNTER — HOSPITAL ENCOUNTER (EMERGENCY)
Facility: HOSPITAL | Age: 52
End: 2022-08-30

## 2024-09-09 ENCOUNTER — TRANSCRIBE ORDERS (OUTPATIENT)
Dept: CARDIOLOGY | Facility: HOSPITAL | Age: 54
End: 2024-09-09
Payer: MEDICARE

## 2024-09-09 DIAGNOSIS — R60.0 LOCALIZED EDEMA: Primary | ICD-10-CM

## 2024-09-23 ENCOUNTER — HOSPITAL ENCOUNTER (OUTPATIENT)
Dept: CARDIOLOGY | Facility: HOSPITAL | Age: 54
Discharge: HOME | End: 2024-09-23
Admitting: FAMILY MEDICINE
Payer: MEDICARE

## 2024-09-23 VITALS
DIASTOLIC BLOOD PRESSURE: 86 MMHG | HEIGHT: 71 IN | HEART RATE: 82 BPM | SYSTOLIC BLOOD PRESSURE: 133 MMHG | BODY MASS INDEX: 33.04 KG/M2 | WEIGHT: 236 LBS

## 2024-09-23 DIAGNOSIS — R60.0 LOCALIZED EDEMA: ICD-10-CM

## 2024-09-23 LAB
AORTIC DIMENSIONLESS INDEX: 0.8 (DI)
ASCENDING AORTA: 2.7 CM
BH CV ECHO MEAS - ACS: 2.25 CM
BH CV ECHO MEAS - AO MAX PG: 4.6 MMHG
BH CV ECHO MEAS - AO MEAN PG: 2.8 MMHG
BH CV ECHO MEAS - AO ROOT DIAM: 2.9 CM
BH CV ECHO MEAS - AO V2 MAX: 107.5 CM/SEC
BH CV ECHO MEAS - AO V2 VTI: 22.4 CM
BH CV ECHO MEAS - AVA(I,D): 2.7 CM2
BH CV ECHO MEAS - EDV(CUBED): 54.5 ML
BH CV ECHO MEAS - EDV(MOD-SP2): 64 ML
BH CV ECHO MEAS - EDV(MOD-SP4): 72 ML
BH CV ECHO MEAS - EF(MOD-BP): 64.5 %
BH CV ECHO MEAS - EF(MOD-SP2): 64.1 %
BH CV ECHO MEAS - EF(MOD-SP4): 65.3 %
BH CV ECHO MEAS - ESV(CUBED): 19.4 ML
BH CV ECHO MEAS - ESV(MOD-SP2): 23 ML
BH CV ECHO MEAS - ESV(MOD-SP4): 25 ML
BH CV ECHO MEAS - FS: 29.2 %
BH CV ECHO MEAS - IVS/LVPW: 0.78 CM
BH CV ECHO MEAS - IVSD: 0.82 CM
BH CV ECHO MEAS - LA DIMENSION: 4 CM
BH CV ECHO MEAS - LAT PEAK E' VEL: 14.3 CM/SEC
BH CV ECHO MEAS - LV DIASTOLIC VOL/BSA (35-75): 31.8 CM2
BH CV ECHO MEAS - LV MASS(C)D: 105.7 GRAMS
BH CV ECHO MEAS - LV MAX PG: 3.4 MMHG
BH CV ECHO MEAS - LV MEAN PG: 1.63 MMHG
BH CV ECHO MEAS - LV SYSTOLIC VOL/BSA (12-30): 11 CM2
BH CV ECHO MEAS - LV V1 MAX: 91.7 CM/SEC
BH CV ECHO MEAS - LV V1 VTI: 18.7 CM
BH CV ECHO MEAS - LVIDD: 3.8 CM
BH CV ECHO MEAS - LVIDS: 2.7 CM
BH CV ECHO MEAS - LVOT AREA: 3.2 CM2
BH CV ECHO MEAS - LVOT DIAM: 2.02 CM
BH CV ECHO MEAS - LVPWD: 1.04 CM
BH CV ECHO MEAS - MED PEAK E' VEL: 9.6 CM/SEC
BH CV ECHO MEAS - MV A DUR: 0.15 SEC
BH CV ECHO MEAS - MV A MAX VEL: 100.6 CM/SEC
BH CV ECHO MEAS - MV DEC SLOPE: 414.8 CM/SEC2
BH CV ECHO MEAS - MV DEC TIME: 183 SEC
BH CV ECHO MEAS - MV E MAX VEL: 78.6 CM/SEC
BH CV ECHO MEAS - MV E/A: 0.78
BH CV ECHO MEAS - MV MAX PG: 5.3 MMHG
BH CV ECHO MEAS - MV MEAN PG: 2.7 MMHG
BH CV ECHO MEAS - MV P1/2T: 71.4 MSEC
BH CV ECHO MEAS - MV V2 VTI: 29.4 CM
BH CV ECHO MEAS - MVA(P1/2T): 3.1 CM2
BH CV ECHO MEAS - MVA(VTI): 2.05 CM2
BH CV ECHO MEAS - PA ACC TIME: 0.19 SEC
BH CV ECHO MEAS - PA V2 MAX: 113.9 CM/SEC
BH CV ECHO MEAS - QP/QS: 1.05
BH CV ECHO MEAS - RV MAX PG: 2.09 MMHG
BH CV ECHO MEAS - RV V1 MAX: 72.3 CM/SEC
BH CV ECHO MEAS - RV V1 VTI: 15.9 CM
BH CV ECHO MEAS - RVDD: 1.39 CM
BH CV ECHO MEAS - RVOT DIAM: 2.25 CM
BH CV ECHO MEAS - SV(LVOT): 60.1 ML
BH CV ECHO MEAS - SV(MOD-SP2): 41 ML
BH CV ECHO MEAS - SV(MOD-SP4): 47 ML
BH CV ECHO MEAS - SV(RVOT): 63.2 ML
BH CV ECHO MEAS - SVI(LVOT): 26.6 ML/M2
BH CV ECHO MEAS - SVI(MOD-SP2): 18.1 ML/M2
BH CV ECHO MEAS - SVI(MOD-SP4): 20.8 ML/M2
BH CV ECHO MEAS - TAPSE (>1.6): 2.19 CM
BH CV ECHO MEASUREMENTS AVERAGE E/E' RATIO: 6.58
BH CV XLRA - RV BASE: 2.43 CM
BH CV XLRA - RV LENGTH: 6.9 CM
BH CV XLRA - RV MID: 2.15 CM
BH CV XLRA - TDI S': 11.3 CM/SEC
LEFT ATRIUM VOLUME INDEX: 11.4 ML/M2
SINUS: 2.42 CM
STJ: 2.36 CM

## 2024-09-23 PROCEDURE — 93306 TTE W/DOPPLER COMPLETE: CPT | Performed by: INTERNAL MEDICINE

## 2024-09-23 PROCEDURE — 25510000001 PERFLUTREN 6.52 MG/ML SUSPENSION 2 ML VIAL: Performed by: FAMILY MEDICINE

## 2024-09-23 PROCEDURE — 93306 TTE W/DOPPLER COMPLETE: CPT

## 2024-09-23 RX ADMIN — SODIUM CHLORIDE 2 ML: 9 INJECTION INTRAMUSCULAR; INTRAVENOUS; SUBCUTANEOUS at 08:29

## (undated) DEVICE — KT ORCA ORCAPOD DISP STRL

## (undated) DEVICE — SPNG GZ STRL 2S 4X4 12PLY

## (undated) DEVICE — NDL HYPO PRECISIONGLIDE REG 25G 1 1/2

## (undated) DEVICE — GOWN,PREVENTION PLUS,XLARGE,STERILE: Brand: MEDLINE

## (undated) DEVICE — GLV SURG SENSICARE PI MIC PF SZ7.5 LF STRL

## (undated) DEVICE — SUT MNCRYL 4/0 PS2 18 IN

## (undated) DEVICE — APPL CHLORAPREP W/TINT 26ML ORNG

## (undated) DEVICE — SUCTION CANISTER, 1000CC,SAFELINER: Brand: DEROYAL

## (undated) DEVICE — SPNG GZ WOVN 4X4IN 12PLY 10/BX STRL

## (undated) DEVICE — SUT VIC 3/0 CTI 36IN J944H

## (undated) DEVICE — Device: Brand: LIGHT GUARD™ FLEXIBLE LIGHT HANDLE COVER (1 EACH/PKG)

## (undated) DEVICE — Device

## (undated) DEVICE — JACKT LAB F/R KNIT CUFF/COLR XLG BLU

## (undated) DEVICE — LAG MINOR PROCEDURE: Brand: MEDLINE INDUSTRIES, INC.

## (undated) DEVICE — DRSNG SURESITE WNDW 4X4.5

## (undated) DEVICE — DRP Z/FRICTION 10X16IN

## (undated) DEVICE — GLV SURG EUDERMIC PF LTX 8 STRL

## (undated) DEVICE — SYR LL 3CC

## (undated) DEVICE — ADAPT CLN BIOGUARD AIR/H2O DISP

## (undated) DEVICE — SAFELINER SUCTION CANISTER 1000CC: Brand: DEROYAL

## (undated) DEVICE — SUT ETHIB 0/0 MO6 I8IN CX45D

## (undated) DEVICE — BW-412T DISP COMBO CLEANING BRUSH: Brand: SINGLE USE COMBINATION CLEANING BRUSH

## (undated) DEVICE — PENCL E/S HNDSWCH PUSHBTN HOLSTR 10FT